# Patient Record
Sex: FEMALE | Race: WHITE | Employment: UNEMPLOYED | URBAN - METROPOLITAN AREA
[De-identification: names, ages, dates, MRNs, and addresses within clinical notes are randomized per-mention and may not be internally consistent; named-entity substitution may affect disease eponyms.]

---

## 2019-02-02 ENCOUNTER — HOSPITAL ENCOUNTER (EMERGENCY)
Age: 24
Discharge: HOME | End: 2019-02-02
Payer: MEDICARE

## 2019-02-02 VITALS
HEART RATE: 96 BPM | WEIGHT: 115 LBS | DIASTOLIC BLOOD PRESSURE: 70 MMHG | RESPIRATION RATE: 16 BRPM | OXYGEN SATURATION: 100 % | TEMPERATURE: 98.3 F | SYSTOLIC BLOOD PRESSURE: 104 MMHG

## 2019-02-02 DIAGNOSIS — S51.812A LACERATION OF LEFT FOREARM, INITIAL ENCOUNTER: Primary | ICD-10-CM

## 2019-02-02 PROCEDURE — 99203 OFFICE O/P NEW LOW 30 MIN: CPT

## 2019-02-02 PROCEDURE — 12013 RPR F/E/E/N/L/M 2.6-5.0 CM: CPT

## 2019-02-02 PROCEDURE — 2500000003 HC RX 250 WO HCPCS: Performed by: NURSE PRACTITIONER

## 2019-02-02 RX ORDER — DEXTROAMPHETAMINE SACCHARATE, AMPHETAMINE ASPARTATE, DEXTROAMPHETAMINE SULFATE AND AMPHETAMINE SULFATE 5; 5; 5; 5 MG/1; MG/1; MG/1; MG/1
20 TABLET ORAL DAILY
COMMUNITY

## 2019-02-02 RX ORDER — LIDOCAINE HYDROCHLORIDE 10 MG/ML
5 INJECTION, SOLUTION INFILTRATION; PERINEURAL ONCE
Status: COMPLETED | OUTPATIENT
Start: 2019-02-02 | End: 2019-02-02

## 2019-02-02 RX ADMIN — LIDOCAINE HYDROCHLORIDE 5 ML: 10 INJECTION, SOLUTION INFILTRATION; PERINEURAL at 19:58

## 2023-02-07 PROBLEM — J38.2 NODULES OF VOCAL CORDS: Status: ACTIVE | Noted: 2023-02-07

## 2023-02-07 PROBLEM — G89.29 CHRONIC THORACIC SPINE PAIN: Status: ACTIVE | Noted: 2023-02-07

## 2023-02-07 PROBLEM — G97.1 SPINAL HEADACHE: Status: ACTIVE | Noted: 2023-02-07

## 2023-02-07 PROBLEM — M54.9 BACK PAIN, CHRONIC: Status: ACTIVE | Noted: 2023-02-07

## 2023-02-07 PROBLEM — J38.3 GLOTTIC INSUFFICIENCY: Status: ACTIVE | Noted: 2023-02-07

## 2023-02-07 PROBLEM — M54.2 CERVICAL PAIN: Status: ACTIVE | Noted: 2023-02-07

## 2023-02-07 PROBLEM — R63.2 POLYPHAGIA: Status: ACTIVE | Noted: 2023-02-07

## 2023-02-07 PROBLEM — G89.29 BACK PAIN, CHRONIC: Status: ACTIVE | Noted: 2023-02-07

## 2023-02-07 PROBLEM — M79.7 FIBROMYALGIA: Status: ACTIVE | Noted: 2023-02-07

## 2023-02-07 PROBLEM — H91.90 HEARING LOSS: Status: ACTIVE | Noted: 2023-02-07

## 2023-02-07 PROBLEM — N39.0 UTI (URINARY TRACT INFECTION): Status: ACTIVE | Noted: 2023-02-07

## 2023-02-07 PROBLEM — R49.0 HOARSENESS: Status: ACTIVE | Noted: 2023-02-07

## 2023-02-07 PROBLEM — F17.210 CIGARETTE NICOTINE DEPENDENCE WITHOUT COMPLICATION: Status: ACTIVE | Noted: 2023-02-07

## 2023-02-07 PROBLEM — F90.9 ADULT ADHD (ATTENTION DEFICIT HYPERACTIVITY DISORDER): Status: ACTIVE | Noted: 2023-02-07

## 2023-02-07 PROBLEM — M54.6 CHRONIC THORACIC SPINE PAIN: Status: ACTIVE | Noted: 2023-02-07

## 2023-02-07 PROBLEM — M25.50 JOINT PAIN: Status: ACTIVE | Noted: 2023-02-07

## 2023-02-07 PROBLEM — E55.9 VITAMIN D DEFICIENCY: Status: ACTIVE | Noted: 2023-02-07

## 2023-02-07 PROBLEM — E04.2 MULTIPLE THYROID NODULES: Status: ACTIVE | Noted: 2023-02-07

## 2023-02-07 PROBLEM — K21.9 GERD (GASTROESOPHAGEAL REFLUX DISEASE): Status: ACTIVE | Noted: 2023-02-07

## 2023-04-01 PROBLEM — N20.0 RECURRENT KIDNEY STONES: Status: ACTIVE | Noted: 2023-04-01

## 2023-04-01 PROBLEM — N20.0 KIDNEY STONE ON LEFT SIDE: Status: ACTIVE | Noted: 2023-04-01

## 2023-04-01 PROBLEM — M54.50 LUMBAGO: Status: ACTIVE | Noted: 2023-04-01

## 2023-04-03 ENCOUNTER — APPOINTMENT (OUTPATIENT)
Dept: PRIMARY CARE | Facility: CLINIC | Age: 28
End: 2023-04-03
Payer: COMMERCIAL

## 2023-05-04 ENCOUNTER — OFFICE VISIT (OUTPATIENT)
Dept: PRIMARY CARE | Facility: CLINIC | Age: 28
End: 2023-05-04
Payer: COMMERCIAL

## 2023-05-04 VITALS
HEIGHT: 63 IN | TEMPERATURE: 98.6 F | HEART RATE: 72 BPM | BODY MASS INDEX: 18.07 KG/M2 | RESPIRATION RATE: 16 BRPM | DIASTOLIC BLOOD PRESSURE: 60 MMHG | SYSTOLIC BLOOD PRESSURE: 100 MMHG | WEIGHT: 102 LBS

## 2023-05-04 DIAGNOSIS — F90.9 ADULT ADHD (ATTENTION DEFICIT HYPERACTIVITY DISORDER): ICD-10-CM

## 2023-05-04 DIAGNOSIS — E04.2 MULTIPLE THYROID NODULES: Primary | ICD-10-CM

## 2023-05-04 DIAGNOSIS — G89.29 CHRONIC BACK PAIN, UNSPECIFIED BACK LOCATION, UNSPECIFIED BACK PAIN LATERALITY: ICD-10-CM

## 2023-05-04 DIAGNOSIS — M54.6 CHRONIC THORACIC SPINE PAIN: ICD-10-CM

## 2023-05-04 DIAGNOSIS — M54.2 CERVICAL PAIN: ICD-10-CM

## 2023-05-04 DIAGNOSIS — E55.9 VITAMIN D DEFICIENCY: ICD-10-CM

## 2023-05-04 DIAGNOSIS — M79.7 FIBROMYALGIA: ICD-10-CM

## 2023-05-04 DIAGNOSIS — Z13.220 LIPID SCREENING: ICD-10-CM

## 2023-05-04 DIAGNOSIS — G89.29 CHRONIC THORACIC SPINE PAIN: ICD-10-CM

## 2023-05-04 DIAGNOSIS — M54.9 CHRONIC BACK PAIN, UNSPECIFIED BACK LOCATION, UNSPECIFIED BACK PAIN LATERALITY: ICD-10-CM

## 2023-05-04 PROCEDURE — 99214 OFFICE O/P EST MOD 30 MIN: CPT | Performed by: FAMILY MEDICINE

## 2023-05-04 RX ORDER — DEXTROAMPHETAMINE SACCHARATE, AMPHETAMINE ASPARTATE, DEXTROAMPHETAMINE SULFATE AND AMPHETAMINE SULFATE 3.75; 3.75; 3.75; 3.75 MG/1; MG/1; MG/1; MG/1
15 TABLET ORAL 2 TIMES DAILY
Refills: 0
Start: 2023-05-04 | End: 2023-10-13 | Stop reason: ALTCHOICE

## 2023-05-04 RX ORDER — OXYCODONE AND ACETAMINOPHEN 5; 325 MG/1; MG/1
1 TABLET ORAL EVERY 12 HOURS
Qty: 60 TABLET | Refills: 0 | Status: SHIPPED | OUTPATIENT
Start: 2023-05-04 | End: 2023-05-30 | Stop reason: SDUPTHER

## 2023-05-04 RX ORDER — MUPIROCIN 20 MG/G
OINTMENT TOPICAL
COMMUNITY
Start: 2023-04-11 | End: 2023-10-27 | Stop reason: ALTCHOICE

## 2023-05-04 ASSESSMENT — PATIENT HEALTH QUESTIONNAIRE - PHQ9
2. FEELING DOWN, DEPRESSED OR HOPELESS: NOT AT ALL
SUM OF ALL RESPONSES TO PHQ9 QUESTIONS 1 AND 2: 0
1. LITTLE INTEREST OR PLEASURE IN DOING THINGS: NOT AT ALL

## 2023-05-04 NOTE — PATIENT INSTRUCTIONS
Nga Phoenix ,    Thank you for coming in today. We at [facility] appreciate your trust in our care. If you have any questions or concerns about the care you received today, please do not hesitate to contact us at [phone].    The following instructions were discussed today:    - get thyroid ultrasound  - get labs   - For blood work: Nothing to eat or drink for at least 10 hours prior. Okay for water or black coffee.   - Follow up in 3 months.

## 2023-05-04 NOTE — PROGRESS NOTES
Subjective   Patient ID: Nga Phoenix is a 28 y.o. female who presents for pt is unable to get to pain mgmnt as often (As she needs to due to kids schedules and transportation.  You did this for her in the past.  No issues with pain mgmnt just hard to get out to [place].).    routine follow up. chronic issues as per assessment and plan.     Patient follows with pain management. The distance has been difficult for her so she is wondering if I can take over the medications.        OARRS:  Ember Morales MD on 5/4/2023  5:29 PM  I have personally reviewed the OARRS report for Nga Phoenix. I have considered the risks of abuse, dependence, addiction and diversion and I believe that it is clinically appropriate for Nga Phoenix to be prescribed this medication    Is the patient prescribed a combination of a benzodiazepine and opioid?  No    Last Urine Drug Screen / ordered today: Yes  Recent Results (from the past [id number] hour(s))   Tapentadol Confirmation, Urine    Collection Time: 01/04/23  3:45 PM   Result Value Ref Range    Tapentadol <25 Cutoff <25 ng/mL    N-Desmethyltapentadol <25 Cutoff <25 ng/mL   Buprenorphine Confirm,Urine    Collection Time: 01/04/23  3:45 PM   Result Value Ref Range    BUPRENORPHINE GLUC, URINE <5 ng/mL    BUPRENORPHINE ,URINE <2 ng/mL    NALOXONE, URINE <100 ng/mL    NORBUPRENORPHINE GLUC,URINE <5 ng/mL    NORBUPRENORPHINE, URINE <2 ng/mL   OPIATE/OPIOID/BENZO PRESCRIPTION COMPLIANCE    Collection Time: 01/04/23  3:45 PM   Result Value Ref Range    DRUG SCREEN COMMENT URINE SEE BELOW     Creatine, Urine 66.9 mg/dL    Amphetamine Screen, Urine PRESUMPTIVE POSITIVE (A) NEGATIVE    Barbiturate Screen, Urine PRESUMPTIVE NEGATIVE NEGATIVE    Cannabinoid Screen, Urine PRESUMPTIVE NEGATIVE NEGATIVE    Cocaine Screen, Urine PRESUMPTIVE NEGATIVE NEGATIVE    PCP Screen, Urine PRESUMPTIVE NEGATIVE NEGATIVE    7-Aminoclonazepam <25 Cutoff <25 ng/mL    Alpha-Hydroxyalprazolam <25 Cutoff <25  ng/mL    Alpha-Hydroxymidazolam <25 Cutoff <25 ng/mL    Alprazolam <25 Cutoff <25 ng/mL    Chlordiazepoxide <25 Cutoff <25 ng/mL    Clonazepam <25 Cutoff <25 ng/mL    Diazepam <25 Cutoff <25 ng/mL    Lorazepam <25 Cutoff <25 ng/mL    Midazolam <25 Cutoff <25 ng/mL    Nordiazepam <25 Cutoff <25 ng/mL    Oxazepam <25 Cutoff <25 ng/mL    Temazepam <25 Cutoff <25 ng/mL    Zolpidem <25 Cutoff <25 ng/mL    Zolpidem Metabolite (ZCA) <25 Cutoff <25 ng/mL    6-Acetylmorphine <25 Cutoff <25 ng/mL    Codeine <50 Cutoff <50 ng/mL    Hydrocodone <25 Cutoff <25 ng/mL    Hydromorphone <25 Cutoff <25 ng/mL    Morphine Urine <50 Cutoff <50 ng/mL    Norhydrocodone <25 Cutoff <25 ng/mL    Noroxycodone 346 (A) Cutoff <25 ng/mL    Oxycodone 28 (A) Cutoff <25 ng/mL    Oxymorphone 128 (A) Cutoff <25 ng/mL    Tramadol <50 Cutoff <50 ng/mL    O-Desmethyltramadol <50 Cutoff <50 ng/mL    Fentanyl <2.5 Cutoff<2.5 ng/mL    Norfentanyl <2.5 Cutoff<2.5 ng/mL    METHADONE CONFIRMATION,URINE <25 Cutoff <25 ng/mL    EDDP <25 Cutoff <25 ng/mL   Amphetamine Confirm, Urine    Collection Time: 01/04/23  3:45 PM   Result Value Ref Range    Amphetamines,Urine >5000 ng/mL    MDA, Urine <200 ng/mL    MDEA, Urine <200 ng/mL    MDMA, Urine <200 ng/mL    Methamphetamine Quant, Ur <200 ng/mL    Phentermine,Urine <200 ng/mL     Results are as expected.     Controlled Substance Agreement:  Date of the Last Agreement: 5/14/23  Reviewed Controlled Substance Agreement including but not limited to the benefits, risks, and alternatives to treatment with a Controlled Substance medication(s).    Opioids:  What is the patient's goal of therapy? Reduction of pain  Is this being achieved with current treatment? yes    I have calculated the patient's Morphine Dose Equivalent (MED):   I have considered referral to Pain Management and/or a specialist, and do not feel it is necessary at this time.    I feel that it is clinically indicated to continue this current medication  "regimen after consideration of alternative therapies, and other non-opioid treatment.    Opioid Risk Screening:  No data recorded    Pain Assessment:  No data recorded  Review of Systems    Objective   /60 (BP Location: Right arm, Patient Position: Sitting, BP Cuff Size: Adult)   Pulse 72   Temp 37 °C (98.6 °F)   Resp 16   Ht 1.6 m (5' 3\")   Wt 46.3 kg (102 lb)   BMI 18.07 kg/m²     Physical Exam    Assessment/Plan   Problem List Items Addressed This Visit       Adult ADHD (attention deficit hyperactivity disorder)    Relevant Medications    amphetamine-dextroamphetamine (Adderall) 15 mg tablet    Other Relevant Orders    CBC and Auto Differential    Comprehensive Metabolic Panel    TSH with reflex to Free T4 if abnormal    Back pain, chronic    Relevant Medications    oxyCODONE-acetaminophen (Percocet) 5-325 mg tablet    Other Relevant Orders    CBC and Auto Differential    Comprehensive Metabolic Panel    TSH with reflex to Free T4 if abnormal    Cervical pain    Relevant Medications    oxyCODONE-acetaminophen (Percocet) 5-325 mg tablet    Other Relevant Orders    CBC and Auto Differential    Comprehensive Metabolic Panel    TSH with reflex to Free T4 if abnormal    Chronic thoracic spine pain    Relevant Medications    oxyCODONE-acetaminophen (Percocet) 5-325 mg tablet    Other Relevant Orders    CBC and Auto Differential    Comprehensive Metabolic Panel    TSH with reflex to Free T4 if abnormal    Fibromyalgia    Relevant Medications    oxyCODONE-acetaminophen (Percocet) 5-325 mg tablet    Other Relevant Orders    CBC and Auto Differential    Comprehensive Metabolic Panel    TSH with reflex to Free T4 if abnormal    Multiple thyroid nodules - Primary    Relevant Orders    US thyroid    CBC and Auto Differential    Comprehensive Metabolic Panel    TSH with reflex to Free T4 if abnormal    Vitamin D deficiency    Relevant Orders    TSH with reflex to Free T4 if abnormal    Vitamin D, Total     Other " Visit Diagnoses       Lipid screening        Relevant Orders    Lipid Panel

## 2023-05-22 ENCOUNTER — APPOINTMENT (OUTPATIENT)
Dept: PRIMARY CARE | Facility: CLINIC | Age: 28
End: 2023-05-22
Payer: COMMERCIAL

## 2023-05-30 DIAGNOSIS — M79.7 FIBROMYALGIA: ICD-10-CM

## 2023-05-30 DIAGNOSIS — M54.9 CHRONIC BACK PAIN, UNSPECIFIED BACK LOCATION, UNSPECIFIED BACK PAIN LATERALITY: ICD-10-CM

## 2023-05-30 DIAGNOSIS — M54.6 CHRONIC THORACIC SPINE PAIN: ICD-10-CM

## 2023-05-30 DIAGNOSIS — M54.2 CERVICAL PAIN: ICD-10-CM

## 2023-05-30 DIAGNOSIS — G89.29 CHRONIC THORACIC SPINE PAIN: ICD-10-CM

## 2023-05-30 DIAGNOSIS — G89.29 CHRONIC BACK PAIN, UNSPECIFIED BACK LOCATION, UNSPECIFIED BACK PAIN LATERALITY: ICD-10-CM

## 2023-05-30 RX ORDER — OXYCODONE AND ACETAMINOPHEN 5; 325 MG/1; MG/1
1 TABLET ORAL EVERY 12 HOURS
Qty: 60 TABLET | Refills: 0 | Status: SHIPPED | OUTPATIENT
Start: 2023-05-30 | End: 2023-06-26 | Stop reason: SDUPTHER

## 2023-05-30 NOTE — TELEPHONE ENCOUNTER
Last appointment: 5/4/2023  Next appointment: 8/14/2023     Eprescribed. I have personally reviewed the OARRS report.  This report is scanned into the electronic medical record.  I have considered the risks of abuse, dependence, addiction and diversion.  I believe that it is clinically appropriate to prescribe this medication.

## 2023-06-26 DIAGNOSIS — G89.29 CHRONIC THORACIC SPINE PAIN: ICD-10-CM

## 2023-06-26 DIAGNOSIS — M79.7 FIBROMYALGIA: ICD-10-CM

## 2023-06-26 DIAGNOSIS — M54.9 CHRONIC BACK PAIN, UNSPECIFIED BACK LOCATION, UNSPECIFIED BACK PAIN LATERALITY: ICD-10-CM

## 2023-06-26 DIAGNOSIS — G89.29 CHRONIC BACK PAIN, UNSPECIFIED BACK LOCATION, UNSPECIFIED BACK PAIN LATERALITY: ICD-10-CM

## 2023-06-26 DIAGNOSIS — M54.6 CHRONIC THORACIC SPINE PAIN: ICD-10-CM

## 2023-06-26 DIAGNOSIS — M54.2 CERVICAL PAIN: ICD-10-CM

## 2023-06-26 RX ORDER — OXYCODONE AND ACETAMINOPHEN 5; 325 MG/1; MG/1
1 TABLET ORAL EVERY 12 HOURS
Qty: 60 TABLET | Refills: 0 | Status: SHIPPED | OUTPATIENT
Start: 2023-06-26 | End: 2023-07-24 | Stop reason: SDUPTHER

## 2023-06-30 LAB
AMPHETAMINE (PRESENCE) IN URINE BY SCREEN METHOD: ABNORMAL
BARBITURATES PRESENCE IN URINE BY SCREEN METHOD: ABNORMAL
BENZODIAZEPINE (PRESENCE) IN URINE BY SCREEN METHOD: ABNORMAL
CANNABINOIDS IN URINE BY SCREEN METHOD: ABNORMAL
COCAINE (PRESENCE) IN URINE BY SCREEN METHOD: ABNORMAL
DRUG SCREEN COMMENT URINE: ABNORMAL
ERYTHROCYTE DISTRIBUTION WIDTH (RATIO) BY AUTOMATED COUNT: 12.5 % (ref 11.5–14.5)
ERYTHROCYTE MEAN CORPUSCULAR HEMOGLOBIN CONCENTRATION (G/DL) BY AUTOMATED: 32.9 G/DL (ref 32–36)
ERYTHROCYTE MEAN CORPUSCULAR VOLUME (FL) BY AUTOMATED COUNT: 98 FL (ref 80–100)
ERYTHROCYTES (10*6/UL) IN BLOOD BY AUTOMATED COUNT: 3.3 X10E12/L (ref 4–5.2)
FENTANYL URINE: ABNORMAL
FERRITIN, PREGNANCY: 60 UG/L
FOLATE, SERUM, PREGNANCY: 21.5 NG/ML
HEMATOCRIT (%) IN BLOOD BY AUTOMATED COUNT: 32.5 % (ref 36–46)
HEMOGLOBIN (G/DL) IN BLOOD: 10.7 G/DL (ref 12–16)
HEPATITIS B VIRUS SURFACE AG PRESENCE IN SERUM: NONREACTIVE
IRON (UG/DL) IN SER/PLAS IN PREGNANCY: 89 UG/DL
IRON BINDING CAPACITY (UG/DL) IN PREGNANCY: 350 UG/DL
IRON SATURATION (%) IN PREGNANCY: 25 %
LEUKOCYTES (10*3/UL) IN BLOOD BY AUTOMATED COUNT: 10.9 X10E9/L (ref 4.4–11.3)
METHADONE (PRESENCE) IN URINE BY SCREEN METHOD: ABNORMAL
OPIATES (PRESENCE) IN URINE BY SCREEN METHOD: ABNORMAL
OXYCODONE (PRESENCE) IN URINE BY SCREEN METHOD: ABNORMAL
PHENCYCLIDINE (PRESENCE) IN URINE BY SCREEN METHOD: ABNORMAL
PLATELETS (10*3/UL) IN BLOOD AUTOMATED COUNT: 381 X10E9/L (ref 150–450)
REFLEX ADDED, ANEMIA PANEL: ABNORMAL
THYROTROPIN (MIU/L) IN SER/PLAS BY DETECTION LIMIT <= 0.05 MIU/L: 1.22 MIU/L (ref 0.44–3.98)
VITAMIN B12, PREGNANCY: 407 PG/ML

## 2023-07-01 LAB
ABO GROUP (TYPE) IN BLOOD: NORMAL
ANTIBODY SCREEN: NORMAL
HEPATITIS C VIRUS AB PRESENCE IN SERUM: NONREACTIVE
HIV 1/ 2 AG/AB SCREEN: NONREACTIVE
RH FACTOR: NORMAL
RUBELLA VIRUS IGG AB: POSITIVE
SYPHILIS TOTAL AB: NONREACTIVE
VARICELLA ZOSTER IGG: POSITIVE

## 2023-07-02 LAB — URINE CULTURE: NO GROWTH

## 2023-07-06 LAB
6-ACETYLMORPHINE: <25 NG/ML
AMPHETAMINES,URINE: >5000 NG/ML
CODEINE: <50 NG/ML
HYDROCODONE: <25 NG/ML
HYDROMORPHONE: <25 NG/ML
MDA,URINE: <200 NG/ML
MDEA,URINE: <200 NG/ML
MDMA,UR: <200 NG/ML
METHAMPHETAMINE QUANTITATIVE URINE: <200 NG/ML
MORPHINE URINE: <50 NG/ML
NORHYDROCODONE: <25 NG/ML
NOROXYCODONE: >1000 NG/ML
OXYCODONE: 419 NG/ML
OXYMORPHONE: 261 NG/ML
PHENTERMINE,UR: <200 NG/ML

## 2023-07-24 DIAGNOSIS — M54.9 CHRONIC BACK PAIN, UNSPECIFIED BACK LOCATION, UNSPECIFIED BACK PAIN LATERALITY: ICD-10-CM

## 2023-07-24 DIAGNOSIS — G89.29 CHRONIC BACK PAIN, UNSPECIFIED BACK LOCATION, UNSPECIFIED BACK PAIN LATERALITY: ICD-10-CM

## 2023-07-24 DIAGNOSIS — G89.29 CHRONIC THORACIC SPINE PAIN: ICD-10-CM

## 2023-07-24 DIAGNOSIS — M79.7 FIBROMYALGIA: ICD-10-CM

## 2023-07-24 DIAGNOSIS — M54.6 CHRONIC THORACIC SPINE PAIN: ICD-10-CM

## 2023-07-24 DIAGNOSIS — M54.2 CERVICAL PAIN: ICD-10-CM

## 2023-07-27 RX ORDER — OXYCODONE AND ACETAMINOPHEN 5; 325 MG/1; MG/1
1 TABLET ORAL EVERY 12 HOURS
Qty: 60 TABLET | Refills: 0 | Status: SHIPPED | OUTPATIENT
Start: 2023-07-27 | End: 2023-08-21 | Stop reason: SDUPTHER

## 2023-08-12 ENCOUNTER — TELEMEDICINE (OUTPATIENT)
Dept: PRIMARY CARE | Facility: CLINIC | Age: 28
End: 2023-08-12
Payer: COMMERCIAL

## 2023-08-12 DIAGNOSIS — J01.00 ACUTE MAXILLARY SINUSITIS, RECURRENCE NOT SPECIFIED: Primary | ICD-10-CM

## 2023-08-12 PROCEDURE — 99213 OFFICE O/P EST LOW 20 MIN: CPT | Performed by: FAMILY MEDICINE

## 2023-08-12 RX ORDER — AZITHROMYCIN 250 MG/1
TABLET, FILM COATED ORAL
Qty: 6 TABLET | Refills: 0 | Status: SHIPPED | OUTPATIENT
Start: 2023-08-12 | End: 2023-10-13 | Stop reason: ALTCHOICE

## 2023-08-12 ASSESSMENT — LIFESTYLE VARIABLES: HISTORY_OF_SMOKING: I AM A CURRENT SMOKER

## 2023-08-12 NOTE — PATIENT INSTRUCTIONS
Please take your medications as prescribed  please call your primary care provider if your symptoms fail to improve or worsen

## 2023-08-12 NOTE — PROGRESS NOTES
Subjective   Patient ID: Nga Phoenix is a 28 y.o. female who presents for sinus pressure and headaches  HPI  Virtual Visit    An interactive audio and video telecommunication system which permits real time communications between the patient (at the originating site) and provider (at the distant site) was utilized to provide this telehealth service.   Verbal consent was requested and obtained from Nga Phoenix on this date, 08/12/23 for a telehealth visit.     Has been having sinus pressure and headaches x 2 weeks, no fever. Has cough as well. No wheezing, does feel sob with the congestion. No vomiting/diarrhea. Has some nausea because of the nasal congestion. Tried OTC medications including nyquil and dayquil, not helping.   Confirmed allergies, patient is also pregnant, did discuss with patient risk to baby with the azithromycin and potential to cross placenta barrier.     Review of Systems  General: no fever  Eyes: no blurry vision  ENT: see HPI  Resp: see HPI  Cardio: no chest pain, no palpitations  Abd: no nausea/vomiting     Vitals:   due to telehealth visit, vitals not obtained, patient did not have them available at the time of the visit       Objective   Physical Exam  Physical exam done virtually through the computer screen     Gen: NAD  eyes: conjunctivae normal   Nose: external nose normal   Resp: does not appear to be short of breath at present time, no audible wheezing, maxillary sinus tenderness when patient presses on her sinuses    Assessment/Plan   Problem List Items Addressed This Visit    None  Visit Diagnoses       Acute maxillary sinusitis, recurrence not specified    -  Primary    Relevant Medications    azithromycin (Zithromax) 250 mg tablet

## 2023-08-14 ENCOUNTER — APPOINTMENT (OUTPATIENT)
Dept: PRIMARY CARE | Facility: CLINIC | Age: 28
End: 2023-08-14
Payer: COMMERCIAL

## 2023-08-21 DIAGNOSIS — M79.7 FIBROMYALGIA: ICD-10-CM

## 2023-08-21 DIAGNOSIS — M54.9 CHRONIC BACK PAIN, UNSPECIFIED BACK LOCATION, UNSPECIFIED BACK PAIN LATERALITY: ICD-10-CM

## 2023-08-21 DIAGNOSIS — G89.29 CHRONIC THORACIC SPINE PAIN: ICD-10-CM

## 2023-08-21 DIAGNOSIS — M54.6 CHRONIC THORACIC SPINE PAIN: ICD-10-CM

## 2023-08-21 DIAGNOSIS — G89.29 CHRONIC BACK PAIN, UNSPECIFIED BACK LOCATION, UNSPECIFIED BACK PAIN LATERALITY: ICD-10-CM

## 2023-08-21 DIAGNOSIS — M54.2 CERVICAL PAIN: ICD-10-CM

## 2023-08-21 RX ORDER — OXYCODONE AND ACETAMINOPHEN 5; 325 MG/1; MG/1
1 TABLET ORAL EVERY 12 HOURS
Qty: 60 TABLET | Refills: 0 | Status: SHIPPED | OUTPATIENT
Start: 2023-08-21 | End: 2023-08-21 | Stop reason: SDUPTHER

## 2023-08-21 RX ORDER — OXYCODONE AND ACETAMINOPHEN 5; 325 MG/1; MG/1
1 TABLET ORAL EVERY 12 HOURS
Qty: 60 TABLET | Refills: 0 | Status: SHIPPED | OUTPATIENT
Start: 2023-08-21 | End: 2023-09-14 | Stop reason: SDUPTHER

## 2023-09-14 DIAGNOSIS — M54.9 CHRONIC BACK PAIN, UNSPECIFIED BACK LOCATION, UNSPECIFIED BACK PAIN LATERALITY: ICD-10-CM

## 2023-09-14 DIAGNOSIS — G89.29 CHRONIC BACK PAIN, UNSPECIFIED BACK LOCATION, UNSPECIFIED BACK PAIN LATERALITY: ICD-10-CM

## 2023-09-14 DIAGNOSIS — M79.7 FIBROMYALGIA: ICD-10-CM

## 2023-09-14 DIAGNOSIS — G89.29 CHRONIC THORACIC SPINE PAIN: ICD-10-CM

## 2023-09-14 DIAGNOSIS — M54.2 CERVICAL PAIN: ICD-10-CM

## 2023-09-14 DIAGNOSIS — M54.6 CHRONIC THORACIC SPINE PAIN: ICD-10-CM

## 2023-09-14 RX ORDER — OXYCODONE AND ACETAMINOPHEN 5; 325 MG/1; MG/1
1 TABLET ORAL EVERY 12 HOURS
Qty: 60 TABLET | Refills: 0 | Status: SHIPPED | OUTPATIENT
Start: 2023-09-19 | End: 2023-10-17 | Stop reason: SDUPTHER

## 2023-09-14 NOTE — TELEPHONE ENCOUNTER
Last appointment: 5/4/2023  Next appointment: 11/7/2023     Eprescribed. I have personally reviewed the OARRS report.  This report is scanned into the electronic medical record.  I have considered the risks of abuse, dependence, addiction and diversion.  I believe that it is clinically appropriate to prescribe this medication.

## 2023-10-04 PROBLEM — R82.5 POSITIVE URINE DRUG SCREEN: Status: ACTIVE | Noted: 2023-10-04

## 2023-10-04 PROBLEM — O99.012 ANEMIA DURING PREGNANCY IN SECOND TRIMESTER (HHS-HCC): Status: ACTIVE | Noted: 2023-10-04

## 2023-10-04 PROBLEM — Z3A.22 22 WEEKS GESTATION OF PREGNANCY (HHS-HCC): Status: ACTIVE | Noted: 2023-10-04

## 2023-10-04 PROBLEM — N92.6 MISSED MENSES: Status: ACTIVE | Noted: 2023-10-04

## 2023-10-04 PROBLEM — O21.9 NAUSEA/VOMITING IN PREGNANCY (HHS-HCC): Status: ACTIVE | Noted: 2023-10-04

## 2023-10-04 PROBLEM — E04.1 THYROID NODULE: Status: ACTIVE | Noted: 2023-10-04

## 2023-10-04 PROBLEM — B37.9 YEAST INFECTION: Status: ACTIVE | Noted: 2023-10-04

## 2023-10-04 PROBLEM — Z15.89 HLA B27 POSITIVE: Status: ACTIVE | Noted: 2023-10-04

## 2023-10-04 RX ORDER — PNV NO.95/FERROUS FUM/FOLIC AC 28MG-0.8MG
1 TABLET ORAL DAILY
COMMUNITY

## 2023-10-04 RX ORDER — FERROUS SULFATE TAB 325 MG (65 MG ELEMENTAL FE) 325 (65 FE) MG
1 TAB ORAL DAILY
COMMUNITY

## 2023-10-04 RX ORDER — TERCONAZOLE 8 MG/G
CREAM VAGINAL
COMMUNITY
Start: 2023-07-06 | End: 2023-10-13 | Stop reason: ALTCHOICE

## 2023-10-04 RX ORDER — PYRIDOXINE HCL (VITAMIN B6) 25 MG
25 TABLET ORAL 3 TIMES DAILY
COMMUNITY
End: 2023-12-10 | Stop reason: HOSPADM

## 2023-10-13 ENCOUNTER — ROUTINE PRENATAL (OUTPATIENT)
Dept: OBSTETRICS AND GYNECOLOGY | Facility: CLINIC | Age: 28
End: 2023-10-13
Payer: COMMERCIAL

## 2023-10-13 ENCOUNTER — LAB (OUTPATIENT)
Dept: LAB | Facility: LAB | Age: 28
End: 2023-10-13
Payer: COMMERCIAL

## 2023-10-13 VITALS — WEIGHT: 127.5 LBS | BODY MASS INDEX: 22.59 KG/M2 | DIASTOLIC BLOOD PRESSURE: 68 MMHG | SYSTOLIC BLOOD PRESSURE: 100 MMHG

## 2023-10-13 DIAGNOSIS — Z3A.28 28 WEEKS GESTATION OF PREGNANCY (HHS-HCC): ICD-10-CM

## 2023-10-13 DIAGNOSIS — F17.210 CIGARETTE NICOTINE DEPENDENCE WITHOUT COMPLICATION: ICD-10-CM

## 2023-10-13 DIAGNOSIS — Z23 NEED FOR TDAP VACCINATION: ICD-10-CM

## 2023-10-13 PROBLEM — O99.013 ANEMIA DURING PREGNANCY IN THIRD TRIMESTER (HHS-HCC): Status: ACTIVE | Noted: 2023-10-04

## 2023-10-13 LAB
ERYTHROCYTE [DISTWIDTH] IN BLOOD BY AUTOMATED COUNT: 12.6 % (ref 11.5–14.5)
GLUCOSE 1H P 50 G GLC PO SERPL-MCNC: 73 MG/DL
HCT VFR BLD AUTO: 30.7 % (ref 36–46)
HGB BLD-MCNC: 10.1 G/DL (ref 12–16)
MCH RBC QN AUTO: 33.6 PG (ref 26–34)
MCHC RBC AUTO-ENTMCNC: 32.9 G/DL (ref 32–36)
MCV RBC AUTO: 102 FL (ref 80–100)
NRBC BLD-RTO: 0 /100 WBCS (ref 0–0)
PLATELET # BLD AUTO: 327 X10*3/UL (ref 150–450)
PMV BLD AUTO: 10.6 FL (ref 7.5–11.5)
RBC # BLD AUTO: 3.01 X10*6/UL (ref 4–5.2)
WBC # BLD AUTO: 10.7 X10*3/UL (ref 4.4–11.3)

## 2023-10-13 PROCEDURE — 82607 VITAMIN B-12: CPT

## 2023-10-13 PROCEDURE — 86780 TREPONEMA PALLIDUM: CPT

## 2023-10-13 PROCEDURE — 90715 TDAP VACCINE 7 YRS/> IM: CPT | Performed by: OBSTETRICS & GYNECOLOGY

## 2023-10-13 PROCEDURE — 82947 ASSAY GLUCOSE BLOOD QUANT: CPT

## 2023-10-13 PROCEDURE — 36415 COLL VENOUS BLD VENIPUNCTURE: CPT

## 2023-10-13 PROCEDURE — 82746 ASSAY OF FOLIC ACID SERUM: CPT

## 2023-10-13 PROCEDURE — 90471 IMMUNIZATION ADMIN: CPT | Performed by: OBSTETRICS & GYNECOLOGY

## 2023-10-13 PROCEDURE — 85027 COMPLETE CBC AUTOMATED: CPT

## 2023-10-13 PROCEDURE — 99213 OFFICE O/P EST LOW 20 MIN: CPT | Performed by: OBSTETRICS & GYNECOLOGY

## 2023-10-13 RX ORDER — DEXTROAMPHETAMINE SACCHARATE, AMPHETAMINE ASPARTATE, DEXTROAMPHETAMINE SULFATE AND AMPHETAMINE SULFATE 5; 5; 5; 5 MG/1; MG/1; MG/1; MG/1
20 TABLET ORAL DAILY
COMMUNITY
End: 2023-11-07 | Stop reason: SDUPTHER

## 2023-10-13 NOTE — PROGRESS NOTES
Subjective   Patient ID [id number]   Nga Phoenix is a 28 y.o.  at 28w4d with a working estimated date of delivery of 2024, by Ultrasound who presents for a routine prenatal visit. She denies vaginal bleeding, leakage of fluid, decreased fetal movements, or contractions.    Her pregnancy is complicated by:  History of  delivery x2, both late  at 35 and 36 weeks, asthma, ADHD, thyroid nodule, anemia    Objective   Physical Exam:   Weight: 57.8 kg (127 lb 8 oz)  Expected Total Weight Gain: 11.5 kg (25 lb)-16 kg (35 lb)   Pregravid BMI: 19.84  BP: 100/68                  Prenatal Labs  Urine Dip:  Lab Results   Component Value Date    KETONESU 100 (2+) (A) 2022     Lab Results   Component Value Date    HGB 10.7 (L) 2023    HCT 32.5 (L) 2023    ABO A 2023    HEPBSAG NONREACTIVE 2023         Imaging  The most recent ultrasound was performed on   with a study GA of   and EFW of  .          Assessment/Plan   Problem List Items Addressed This Visit             ICD-10-CM    Cigarette nicotine dependence without complication - Primary F17.210    28 weeks gestation of pregnancy Z3A.28    Relevant Orders    CBC Anemia Panel With Reflex, Pregnancy    Glucose, 1 Hour Screen, Pregnancy    Syphilis Screen with Reflex     Other Visit Diagnoses         Codes    Need for Tdap vaccination     Z23    Relevant Orders    Tdap vaccine, age 7 years and older  (BOOSTRIX) (Completed)          Continue prenatal vitamin.  Expected mode of delivery vaginal   Follow up in 2 week for a routine prenatal visit.

## 2023-10-14 LAB
FOLATE SERPL-MCNC: 20 NG/ML
REFLEX ADDED, ANEMIA PANEL: NORMAL
T PALLIDUM AB SER QL: NONREACTIVE
VIT B12 SERPL-MCNC: 310 PG/ML

## 2023-10-17 DIAGNOSIS — M54.9 CHRONIC BACK PAIN, UNSPECIFIED BACK LOCATION, UNSPECIFIED BACK PAIN LATERALITY: ICD-10-CM

## 2023-10-17 DIAGNOSIS — G89.29 CHRONIC THORACIC SPINE PAIN: ICD-10-CM

## 2023-10-17 DIAGNOSIS — M79.7 FIBROMYALGIA: ICD-10-CM

## 2023-10-17 DIAGNOSIS — G89.29 CHRONIC BACK PAIN, UNSPECIFIED BACK LOCATION, UNSPECIFIED BACK PAIN LATERALITY: ICD-10-CM

## 2023-10-17 DIAGNOSIS — M54.2 CERVICAL PAIN: ICD-10-CM

## 2023-10-17 DIAGNOSIS — M54.6 CHRONIC THORACIC SPINE PAIN: ICD-10-CM

## 2023-10-17 RX ORDER — OXYCODONE AND ACETAMINOPHEN 5; 325 MG/1; MG/1
1 TABLET ORAL EVERY 12 HOURS
Qty: 60 TABLET | Refills: 0 | Status: SHIPPED | OUTPATIENT
Start: 2023-10-17 | End: 2023-11-07 | Stop reason: SDUPTHER

## 2023-10-23 ENCOUNTER — TELEPHONE (OUTPATIENT)
Dept: OBSTETRICS AND GYNECOLOGY | Facility: CLINIC | Age: 28
End: 2023-10-23
Payer: COMMERCIAL

## 2023-10-23 NOTE — TELEPHONE ENCOUNTER
Patient called in wanting to schedule a 30 week U/S.  She claims she has been trying to call for 3 weeks now downstairs to schedule. She also claims she is 30 weeks now.  She didn't have the paper to let me know what she needs scheduled.  Please advise.    She mays have an appointment on the 27th with TB.

## 2023-10-25 ENCOUNTER — ANCILLARY PROCEDURE (OUTPATIENT)
Dept: RADIOLOGY | Facility: CLINIC | Age: 28
End: 2023-10-25
Payer: COMMERCIAL

## 2023-10-25 DIAGNOSIS — Z34.90 ENCOUNTER FOR SUPERVISION OF NORMAL PREGNANCY, UNSPECIFIED, UNSPECIFIED TRIMESTER (HHS-HCC): ICD-10-CM

## 2023-10-25 PROCEDURE — 76816 OB US FOLLOW-UP PER FETUS: CPT | Performed by: STUDENT IN AN ORGANIZED HEALTH CARE EDUCATION/TRAINING PROGRAM

## 2023-10-25 PROCEDURE — 76816 OB US FOLLOW-UP PER FETUS: CPT

## 2023-10-25 PROCEDURE — 76819 FETAL BIOPHYS PROFIL W/O NST: CPT | Performed by: STUDENT IN AN ORGANIZED HEALTH CARE EDUCATION/TRAINING PROGRAM

## 2023-10-26 NOTE — PROGRESS NOTES
"Subjective   Patient ID [id number]   Nga Phoenix is a 28 y.o.  at 30w 4d with a working estimated date of delivery of 2024, by Ultrasound who presents for a routine prenatal visit. She denies vaginal bleeding, leakage of fluid, decreased fetal movements, or contractions.  C/o flank pain and dysuria  Her pregnancy is complicated by:   delivery x 2, anemia in pregnancy     Expected Total Weight Gain: 11.5 kg (25 lb)-16 kg (35 lb)   Pregravid BMI: 19.84                     Prenatal Labs  Urine Dip:  Lab Results   Component Value Date    KETONESU 100 (2+) (A) 2022     Lab Results   Component Value Date    HGB 10.1 (L) 10/13/2023    HCT 30.7 (L) 10/13/2023    ABO A 2023    HEPBSAG NONREACTIVE 2023     No results found for: \"PAPPA\", \"AFP\", \"HCG\", \"ESTRIOL\", \"INHBA\"  No results found for: \"GLUF\", \"GLUT1\", \"DFAWTNR3WP\", \"IHCDDMW9LG\"    Imaging  The most recent ultrasound was performed on   with a study GA of   and EFW of  .          Assessment/Plan   Problem List Items Addressed This Visit             ICD-10-CM    Cigarette nicotine dependence without complication F17.210    Anemia during pregnancy in third trimester - Primary O99.013    30 weeks gestation of pregnancy Z3A.30     Other Visit Diagnoses         Codes    Flu vaccine need     Z23    Relevant Orders    Flu vaccine (IIV4) age 6 months and greater, preservative free (Completed)          Continue prenatal vitamin.  Labs reviewed.  C/o flank pain and Dysuria, clean catch urine cx ordered  Expected mode of delivery vaginal  Flu vaccine done  Follow up in 2 week for a routine prenatal visit.  "

## 2023-10-27 ENCOUNTER — ROUTINE PRENATAL (OUTPATIENT)
Dept: OBSTETRICS AND GYNECOLOGY | Facility: CLINIC | Age: 28
End: 2023-10-27
Payer: COMMERCIAL

## 2023-10-27 VITALS — SYSTOLIC BLOOD PRESSURE: 100 MMHG | DIASTOLIC BLOOD PRESSURE: 62 MMHG | WEIGHT: 129 LBS | BODY MASS INDEX: 22.85 KG/M2

## 2023-10-27 DIAGNOSIS — O26.893 DYSURIA DURING PREGNANCY IN THIRD TRIMESTER (HHS-HCC): ICD-10-CM

## 2023-10-27 DIAGNOSIS — Z23 FLU VACCINE NEED: ICD-10-CM

## 2023-10-27 DIAGNOSIS — Z3A.30 30 WEEKS GESTATION OF PREGNANCY (HHS-HCC): ICD-10-CM

## 2023-10-27 DIAGNOSIS — O99.013 ANEMIA DURING PREGNANCY IN THIRD TRIMESTER (HHS-HCC): Primary | ICD-10-CM

## 2023-10-27 DIAGNOSIS — F17.210 CIGARETTE NICOTINE DEPENDENCE WITHOUT COMPLICATION: ICD-10-CM

## 2023-10-27 DIAGNOSIS — R30.0 DYSURIA DURING PREGNANCY IN THIRD TRIMESTER (HHS-HCC): ICD-10-CM

## 2023-10-27 PROBLEM — Z3A.22 22 WEEKS GESTATION OF PREGNANCY (HHS-HCC): Status: RESOLVED | Noted: 2023-10-04 | Resolved: 2023-10-27

## 2023-10-27 LAB
POC BILIRUBIN, URINE: NEGATIVE
POC BLOOD, URINE: NEGATIVE
POC GLUCOSE, URINE: NEGATIVE MG/DL
POC KETONES, URINE: NEGATIVE MG/DL
POC LEUKOCYTES, URINE: ABNORMAL
POC NITRITE,URINE: NEGATIVE
POC PH, URINE: 7 PH
POC PROTEIN, URINE: ABNORMAL MG/DL
POC SPECIFIC GRAVITY, URINE: 1.01
POC UROBILINOGEN, URINE: 0.2 EU/DL

## 2023-10-27 PROCEDURE — 99213 OFFICE O/P EST LOW 20 MIN: CPT | Performed by: OBSTETRICS & GYNECOLOGY

## 2023-10-27 PROCEDURE — 90471 IMMUNIZATION ADMIN: CPT | Performed by: OBSTETRICS & GYNECOLOGY

## 2023-10-27 PROCEDURE — 81003 URINALYSIS AUTO W/O SCOPE: CPT | Performed by: OBSTETRICS & GYNECOLOGY

## 2023-10-27 PROCEDURE — 87086 URINE CULTURE/COLONY COUNT: CPT

## 2023-10-27 PROCEDURE — 90686 IIV4 VACC NO PRSV 0.5 ML IM: CPT | Performed by: OBSTETRICS & GYNECOLOGY

## 2023-10-29 LAB — BACTERIA UR CULT: NO GROWTH

## 2023-11-07 ENCOUNTER — OFFICE VISIT (OUTPATIENT)
Dept: PRIMARY CARE | Facility: CLINIC | Age: 28
End: 2023-11-07
Payer: COMMERCIAL

## 2023-11-07 VITALS
SYSTOLIC BLOOD PRESSURE: 120 MMHG | BODY MASS INDEX: 23.04 KG/M2 | TEMPERATURE: 98 F | RESPIRATION RATE: 16 BRPM | WEIGHT: 130 LBS | HEIGHT: 63 IN | DIASTOLIC BLOOD PRESSURE: 64 MMHG | HEART RATE: 100 BPM | OXYGEN SATURATION: 97 %

## 2023-11-07 DIAGNOSIS — F90.9 ADULT ADHD (ATTENTION DEFICIT HYPERACTIVITY DISORDER): Primary | ICD-10-CM

## 2023-11-07 DIAGNOSIS — M54.6 CHRONIC THORACIC SPINE PAIN: ICD-10-CM

## 2023-11-07 DIAGNOSIS — E04.2 MULTIPLE THYROID NODULES: ICD-10-CM

## 2023-11-07 DIAGNOSIS — G89.29 CHRONIC THORACIC SPINE PAIN: ICD-10-CM

## 2023-11-07 DIAGNOSIS — M54.2 CERVICAL PAIN: ICD-10-CM

## 2023-11-07 DIAGNOSIS — M79.7 FIBROMYALGIA: ICD-10-CM

## 2023-11-07 DIAGNOSIS — R06.2 WHEEZING: ICD-10-CM

## 2023-11-07 DIAGNOSIS — G89.29 CHRONIC BACK PAIN, UNSPECIFIED BACK LOCATION, UNSPECIFIED BACK PAIN LATERALITY: ICD-10-CM

## 2023-11-07 DIAGNOSIS — F17.210 CIGARETTE NICOTINE DEPENDENCE WITHOUT COMPLICATION: ICD-10-CM

## 2023-11-07 DIAGNOSIS — M54.9 CHRONIC BACK PAIN, UNSPECIFIED BACK LOCATION, UNSPECIFIED BACK PAIN LATERALITY: ICD-10-CM

## 2023-11-07 PROBLEM — N39.0 UTI (URINARY TRACT INFECTION): Status: RESOLVED | Noted: 2023-02-07 | Resolved: 2023-11-07

## 2023-11-07 PROBLEM — R49.0 HOARSENESS: Status: RESOLVED | Noted: 2023-02-07 | Resolved: 2023-11-07

## 2023-11-07 PROBLEM — N92.6 MISSED MENSES: Status: RESOLVED | Noted: 2023-10-04 | Resolved: 2023-11-07

## 2023-11-07 PROBLEM — R63.2 POLYPHAGIA: Status: RESOLVED | Noted: 2023-02-07 | Resolved: 2023-11-07

## 2023-11-07 PROBLEM — M25.50 JOINT PAIN: Status: RESOLVED | Noted: 2023-02-07 | Resolved: 2023-11-07

## 2023-11-07 PROBLEM — M54.50 LUMBAGO: Status: RESOLVED | Noted: 2023-04-01 | Resolved: 2023-11-07

## 2023-11-07 PROBLEM — H91.90 HEARING LOSS: Status: RESOLVED | Noted: 2023-02-07 | Resolved: 2023-11-07

## 2023-11-07 PROBLEM — N20.0 KIDNEY STONE ON LEFT SIDE: Status: RESOLVED | Noted: 2023-04-01 | Resolved: 2023-11-07

## 2023-11-07 PROBLEM — G97.1 SPINAL HEADACHE: Status: RESOLVED | Noted: 2023-02-07 | Resolved: 2023-11-07

## 2023-11-07 PROBLEM — R82.5 POSITIVE URINE DRUG SCREEN: Status: RESOLVED | Noted: 2023-10-04 | Resolved: 2023-11-07

## 2023-11-07 PROCEDURE — 4004F PT TOBACCO SCREEN RCVD TLK: CPT | Performed by: FAMILY MEDICINE

## 2023-11-07 PROCEDURE — 99214 OFFICE O/P EST MOD 30 MIN: CPT | Performed by: FAMILY MEDICINE

## 2023-11-07 RX ORDER — DEXTROAMPHETAMINE SACCHARATE, AMPHETAMINE ASPARTATE, DEXTROAMPHETAMINE SULFATE AND AMPHETAMINE SULFATE 5; 5; 5; 5 MG/1; MG/1; MG/1; MG/1
20 TABLET ORAL DAILY
Start: 2023-11-07 | End: 2024-05-06 | Stop reason: SDUPTHER

## 2023-11-07 RX ORDER — ALBUTEROL SULFATE 90 UG/1
2 AEROSOL, METERED RESPIRATORY (INHALATION) EVERY 4 HOURS PRN
Qty: 8 G | Refills: 1 | Status: SHIPPED | OUTPATIENT
Start: 2023-11-07 | End: 2024-11-06

## 2023-11-07 RX ORDER — OXYCODONE AND ACETAMINOPHEN 5; 325 MG/1; MG/1
1 TABLET ORAL EVERY 12 HOURS
Qty: 60 TABLET | Refills: 0 | Status: SHIPPED | OUTPATIENT
Start: 2023-11-14 | End: 2023-12-10 | Stop reason: HOSPADM

## 2023-11-07 ASSESSMENT — ENCOUNTER SYMPTOMS
SORE THROAT: 0
COUGH: 0
PALPITATIONS: 0
FREQUENCY: 0
CHEST TIGHTNESS: 0
NAUSEA: 0
SHORTNESS OF BREATH: 0
HEMATURIA: 0
NUMBNESS: 0
DYSURIA: 0
HEADACHES: 0
ADENOPATHY: 0
CONFUSION: 0
OCCASIONAL FEELINGS OF UNSTEADINESS: 1
NERVOUS/ANXIOUS: 0
DIAPHORESIS: 0
CONSTIPATION: 0
POLYPHAGIA: 0
SINUS PAIN: 0
ABDOMINAL PAIN: 0
DEPRESSION: 0
WHEEZING: 0
FEVER: 0
SINUS PRESSURE: 0
POLYDIPSIA: 0
UNEXPECTED WEIGHT CHANGE: 0
DIZZINESS: 0
DYSPHORIC MOOD: 0
CHILLS: 0
LOSS OF SENSATION IN FEET: 1
DIARRHEA: 0
VOMITING: 0
LIGHT-HEADEDNESS: 0

## 2023-11-07 ASSESSMENT — PATIENT HEALTH QUESTIONNAIRE - PHQ9
2. FEELING DOWN, DEPRESSED OR HOPELESS: NOT AT ALL
1. LITTLE INTEREST OR PLEASURE IN DOING THINGS: NOT AT ALL
SUM OF ALL RESPONSES TO PHQ9 QUESTIONS 1 AND 2: 0

## 2023-11-07 ASSESSMENT — LIFESTYLE VARIABLES: TOTAL SCORE: 3

## 2023-11-07 NOTE — PATIENT INSTRUCTIONS
Nga Phoenix ,    Thank you for coming in today. We at [facility] appreciate your trust in our care. If you have any questions or concerns about the care you received today, please do not hesitate to contact us at [phone].    The following instructions were discussed today:    - Follow up in 3 months.        Ways to Help Prevent Falls at Home    Quick Tips   ? Ask for help if you need it. Most people want to help!   ? Get up slowly after sitting or laying down   ? Wear a medical alert device or keep cell phone in your pocket   ? Use night lights, especially areas near a bathroom   ? Keep the items you use often within reach on a small stool or end table   ? Use an assistive device such as walker or cane, as directed by provider/physical therapy   ? Use a non-slip mat and grab bars in your bathroom. Look for home health sections for best options     Other Areas to Focus On   ? Exercise and nutrition: Regular exercise or taking a falls prevention class are great ways improve strength and balance. Don’t forget to stay hydrated and bring a snack!   ? Medicine side effects: Some medicines can make you sleepy or dizzy, which could cause a fall. Ask your healthcare provider about the side effects your medicines could cause. Be sure to let them know if you take any vitamins or supplements as well.   ? Tripping hazards: Remove items you could trip on, such as loose mats, rugs, cords, and clutter. Wear closed toe shoes with rubber soles.   ? Health and wellness: Get regular checkups with your healthcare provider, plus routine vision and hearing screenings. Talk with your healthcare provider about:   o Your medicines and the possible side effects - bring them in a bag if that is easier!   o Problems with balance or feeling dizzy   o Ways to promote bone health, such as Vitamin D and calcium supplements   o Questions or concerns about falling     *Ask your healthcare team if you have questions      ©[facility], 2022

## 2023-11-07 NOTE — PROGRESS NOTES
Subjective   Patient ID: Nga Phoenix is a 28 y.o. female who presents for follow up, received flu vaccine (Pain meds refill if able or she can call in if you prefer).    routine follow up. chronic issues as per assessment and plan.     She is now 32 weeks pregnant. Went to see high risk OB/GYN given her current use of opioid and stimulant medication. Note reviewed. Okay to use low dose of these medications with little risk to baby. Will also be able to breast feed post delivery.      OARRS:  Ember Morales MD on 11/7/2023  1:40 PM  I have personally reviewed the OARRS report for Nga Phoenix. I have considered the risks of abuse, dependence, addiction and diversion and I believe that it is clinically appropriate for Nga Phoenix to be prescribed this medication    Is the patient prescribed a combination of a benzodiazepine and opioid?  No    Last Urine Drug Screen / ordered today: No  Recent Results (from the past 8760 hour(s))   Opiate Confirmation, Urine    Collection Time: 07/01/23  9:38 AM   Result Value Ref Range    6-Acetylmorphine <25 Cutoff <25 ng/mL    Codeine <50 Cutoff <50 ng/mL    Hydrocodone <25 Cutoff <25 ng/mL    Hydromorphone <25 Cutoff <25 ng/mL    Morphine Urine <50 Cutoff <50 ng/mL    Norhydrocodone <25 Cutoff <25 ng/mL    Noroxycodone >1000 (A) Cutoff <25 ng/mL    Oxycodone 419 (A) Cutoff <25 ng/mL    Oxymorphone 261 (A) Cutoff <25 ng/mL   Amphetamine Confirm, Urine    Collection Time: 06/30/23  1:47 PM   Result Value Ref Range    Amphetamines,Urine >5000 ng/mL    MDA, Urine <200 ng/mL    MDEA, Urine <200 ng/mL    MDMA, Urine <200 ng/mL    Methamphetamine Quant, Ur <200 ng/mL    Phentermine,Urine <200 ng/mL   Drug Screen, Urine With Reflex to Confirmation    Collection Time: 06/30/23  1:47 PM   Result Value Ref Range    DRUG SCREEN COMMENT URINE SEE BELOW     Amphetamine Screen, Urine PRESUMPTIVE POSITIVE (A) NEGATIVE    Barbiturate Screen, Urine PRESUMPTIVE NEGATIVE NEGATIVE     BENZODIAZEPINE (PRESENCE) IN URINE BY SCREEN METHOD PRESUMPTIVE NEGATIVE NEGATIVE    Cannabinoid Screen, Urine PRESUMPTIVE NEGATIVE NEGATIVE    Cocaine Screen, Urine PRESUMPTIVE NEGATIVE NEGATIVE    Fentanyl, Ur PRESUMPTIVE NEGATIVE NEGATIVE    Methadone Screen, Urine PRESUMPTIVE NEGATIVE NEGATIVE    Opiate Screen, Urine PRESUMPTIVE NEGATIVE NEGATIVE    Oxycodone Screen, Ur PRESUMPTIVE POSITIVE (A) NEGATIVE    PCP Screen, Urine PRESUMPTIVE NEGATIVE NEGATIVE   Tapentadol Confirmation, Urine    Collection Time: 01/04/23  3:45 PM   Result Value Ref Range    Tapentadol <25 Cutoff <25 ng/mL    N-Desmethyltapentadol <25 Cutoff <25 ng/mL   Buprenorphine Confirm,Urine    Collection Time: 01/04/23  3:45 PM   Result Value Ref Range    BUPRENORPHINE GLUC, URINE <5 ng/mL    BUPRENORPHINE ,URINE <2 ng/mL    NALOXONE, URINE <100 ng/mL    NORBUPRENORPHINE GLUC,URINE <5 ng/mL    NORBUPRENORPHINE, URINE <2 ng/mL   OPIATE/OPIOID/BENZO PRESCRIPTION COMPLIANCE    Collection Time: 01/04/23  3:45 PM   Result Value Ref Range    DRUG SCREEN COMMENT URINE SEE BELOW     Creatine, Urine 66.9 mg/dL    Amphetamine Screen, Urine PRESUMPTIVE POSITIVE (A) NEGATIVE    Barbiturate Screen, Urine PRESUMPTIVE NEGATIVE NEGATIVE    Cannabinoid Screen, Urine PRESUMPTIVE NEGATIVE NEGATIVE    Cocaine Screen, Urine PRESUMPTIVE NEGATIVE NEGATIVE    PCP Screen, Urine PRESUMPTIVE NEGATIVE NEGATIVE    7-Aminoclonazepam <25 Cutoff <25 ng/mL    Alpha-Hydroxyalprazolam <25 Cutoff <25 ng/mL    Alpha-Hydroxymidazolam <25 Cutoff <25 ng/mL    Alprazolam <25 Cutoff <25 ng/mL    Chlordiazepoxide <25 Cutoff <25 ng/mL    Clonazepam <25 Cutoff <25 ng/mL    Diazepam <25 Cutoff <25 ng/mL    Lorazepam <25 Cutoff <25 ng/mL    Midazolam <25 Cutoff <25 ng/mL    Nordiazepam <25 Cutoff <25 ng/mL    Oxazepam <25 Cutoff <25 ng/mL    Temazepam <25 Cutoff <25 ng/mL    Zolpidem <25 Cutoff <25 ng/mL    Zolpidem Metabolite (ZCA) <25 Cutoff <25 ng/mL    6-Acetylmorphine <25 Cutoff <25  ng/mL    Codeine <50 Cutoff <50 ng/mL    Hydrocodone <25 Cutoff <25 ng/mL    Hydromorphone <25 Cutoff <25 ng/mL    Morphine Urine <50 Cutoff <50 ng/mL    Norhydrocodone <25 Cutoff <25 ng/mL    Noroxycodone 346 (A) Cutoff <25 ng/mL    Oxycodone 28 (A) Cutoff <25 ng/mL    Oxymorphone 128 (A) Cutoff <25 ng/mL    Tramadol <50 Cutoff <50 ng/mL    O-Desmethyltramadol <50 Cutoff <50 ng/mL    Fentanyl <2.5 Cutoff<2.5 ng/mL    Norfentanyl <2.5 Cutoff<2.5 ng/mL    METHADONE CONFIRMATION,URINE <25 Cutoff <25 ng/mL    EDDP <25 Cutoff <25 ng/mL     Results are as expected.         Controlled Substance Agreement: Opioid   Date of the Last Agreement: 5/4/23  Reviewed Controlled Substance Agreement including but not limited to the benefits, risks, and alternatives to treatment with a Controlled Substance medication(s).    Opioids:  What is the patient's goal of therapy? Reduction of pain  Is this being achieved with current treatment? Yes     I have calculated the patient's Morphine Dose Equivalent (MED): 15  I have considered referral to Pain Management and/or a specialist, and do not feel it is necessary at this time.    I feel that it is clinically indicated to continue this current medication regimen after consideration of alternative therapies, and other non-opioid treatment.    Opioid Risk Screening:  Family History of Substance Abuse  Alcohol: 0 (11/7/2023  2:00 PM)  Illegal Drugs: 0 (11/7/2023  2:00 PM)  Prescription Drugs: 0 (11/7/2023  2:00 PM)    Personal History of Substance Abuse  Alcohol: 0 (11/7/2023  2:00 PM)  Illegal Drugs: 0 (11/7/2023  2:00 PM)  Prescription Drugs: 0 (11/7/2023  2:00 PM)    Patient Age (16-45)  Age (16-45): 1 (11/7/2023  2:00 PM)    History of Preadolescent Sexual Abuse  History of Preadolescent Sexual Abuse: .0 (11/7/2023  2:00 PM)    Psychological Disease  Attention Deficit Disorder, Obsessive Compulsive Disorder, Bipolar, Schizophrenia: 2 (11/7/2023  2:00 PM)  Depression: 0 (11/7/2023  2:00  PM)    Total Score  Total Score: 3 (11/7/2023  2:00 PM)    Total Score Risk Category  TOTAL SCORE CATEGORY: Low Risk (0-3) (11/7/2023  2:00 PM)        Pain Assessment:  Analgesia  What was your pain level on average during the past week?: 5  What was your pain level at its worst during the past week?: 8  What percentage of your pain has been relieved during the past week?: 60 %  Is the amount of pain relief you are now obtaining from your current pain relievers enough to make a real difference in your life?: Y  Query to Clinician: Is the patient's pain relief clinically significant?: Yes    Activities of Daily Living  Physical Functioning: Better  Family Relationships: Better  Social Relationships: Better  Mood: Better  Sleep Patterns: Better  Overall Functioning: Better    Adverse Events  Is patient experiencing any side effects from current pain relievers?: N      Assessment  Is your overall impression that this patient is benefiting from opioid therapy?: Yes  Specific Analgesic Plan: Continue present regimen        Review of Systems   Constitutional:  Negative for chills, diaphoresis, fever and unexpected weight change.   HENT:  Negative for congestion, sinus pressure, sinus pain, sneezing and sore throat.    Respiratory:  Negative for cough, chest tightness, shortness of breath and wheezing.    Cardiovascular:  Negative for chest pain, palpitations and leg swelling.   Gastrointestinal:  Negative for abdominal pain, constipation, diarrhea, nausea and vomiting.   Endocrine: Negative for cold intolerance, heat intolerance, polydipsia, polyphagia and polyuria.   Genitourinary:  Negative for dysuria, frequency, hematuria and urgency.   Neurological:  Negative for dizziness, syncope, light-headedness, numbness and headaches.   Hematological:  Negative for adenopathy.   Psychiatric/Behavioral:  Negative for confusion and dysphoric mood. The patient is not nervous/anxious.        Objective   /64 (BP Location:  "Right arm, Patient Position: Sitting, BP Cuff Size: Adult)   Pulse 100   Temp 36.7 °C (98 °F)   Resp 16   Ht 1.6 m (5' 3\")   Wt 59 kg (130 lb)   LMP 02/15/2023   SpO2 97%   BMI 23.03 kg/m²     Physical Exam  Vitals and nursing note reviewed.   Constitutional:       General: She is not in acute distress.     Appearance: Normal appearance.   HENT:      Head: Normocephalic and atraumatic.      Nose: Nose normal.   Eyes:      Extraocular Movements: Extraocular movements intact.      Conjunctiva/sclera: Conjunctivae normal.      Pupils: Pupils are equal, round, and reactive to light.   Cardiovascular:      Rate and Rhythm: Normal rate and regular rhythm.      Heart sounds: No murmur heard.     No friction rub. No gallop.   Pulmonary:      Effort: Pulmonary effort is normal.      Breath sounds: Normal breath sounds. No wheezing, rhonchi or rales.   Abdominal:      General: Bowel sounds are normal. There is no distension.      Palpations: Abdomen is soft.      Tenderness: There is no abdominal tenderness.   Musculoskeletal:         General: Normal range of motion.      Cervical back: Normal range of motion and neck supple.   Skin:     General: Skin is warm and dry.   Neurological:      General: No focal deficit present.      Mental Status: She is alert and oriented to person, place, and time.      Deep Tendon Reflexes: Reflexes normal.   Psychiatric:         Mood and Affect: Mood normal.         Behavior: Behavior normal.         Thought Content: Thought content normal.         Judgment: Judgment normal.         Assessment/Plan   Problem List Items Addressed This Visit             ICD-10-CM    Adult ADHD (attention deficit hyperactivity disorder) - Primary F90.9     - follows with psychiatry. on adderall  - saw high risk OB and stated okay to continue         Relevant Medications    amphetamine-dextroamphetamine (Adderall) 20 mg tablet    Back pain, chronic M54.9, G89.29     - controlled on oxycodone/ " acetaminophen  - saw high risk OB and stated okay to continue         Relevant Medications    oxyCODONE-acetaminophen (Percocet) 5-325 mg tablet (Start on 11/14/2023)    Cervical pain M54.2     - controlled on oxycodone/ acetaminophen  - saw high risk OB and stated okay to continue         Relevant Medications    oxyCODONE-acetaminophen (Percocet) 5-325 mg tablet (Start on 11/14/2023)    Chronic thoracic spine pain M54.6, G89.29     - controlled on oxycodone/ acetaminophen  - saw high risk OB and stated okay to continue         Relevant Medications    oxyCODONE-acetaminophen (Percocet) 5-325 mg tablet (Start on 11/14/2023)    Cigarette nicotine dependence without complication F17.210     - encouraged cessation         Drug exposure, gestational P04.9     - opioid and stimulant  - saw high risk OB and states okay to continue both         Fibromyalgia M79.7     - controlled on oxycodone/ acetaminophen  - saw high risk OB and stated okay to continue         Relevant Medications    oxyCODONE-acetaminophen (Percocet) 5-325 mg tablet (Start on 11/14/2023)    Multiple thyroid nodules E04.2     - seen on US 9/2021  - per report, repeat US at 1, 2, 3, and 5 years  - overdue for repeat US. ordered again today         Relevant Orders    US thyroid    Wheezing R06.2     - secondary to allergies  - worse due to pregnancy          Relevant Medications    albuterol (Ventolin HFA) 90 mcg/actuation inhaler        Patient was identified as a fall risk. Risk prevention instructions provided.

## 2023-11-07 NOTE — ASSESSMENT & PLAN NOTE
- seen on US 9/2021  - per report, repeat US at 1, 2, 3, and 5 years  - overdue for repeat US. ordered again today

## 2023-11-09 ENCOUNTER — ROUTINE PRENATAL (OUTPATIENT)
Dept: OBSTETRICS AND GYNECOLOGY | Facility: CLINIC | Age: 28
End: 2023-11-09
Payer: COMMERCIAL

## 2023-11-09 VITALS — DIASTOLIC BLOOD PRESSURE: 72 MMHG | WEIGHT: 129 LBS | SYSTOLIC BLOOD PRESSURE: 118 MMHG | BODY MASS INDEX: 22.85 KG/M2

## 2023-11-09 DIAGNOSIS — Z3A.32 32 WEEKS GESTATION OF PREGNANCY (HHS-HCC): Primary | ICD-10-CM

## 2023-11-09 DIAGNOSIS — O99.013 ANEMIA DURING PREGNANCY IN THIRD TRIMESTER (HHS-HCC): ICD-10-CM

## 2023-11-09 DIAGNOSIS — E07.9 THYROID DYSFUNCTION: ICD-10-CM

## 2023-11-09 PROCEDURE — 99213 OFFICE O/P EST LOW 20 MIN: CPT | Performed by: OBSTETRICS & GYNECOLOGY

## 2023-11-09 NOTE — PROGRESS NOTES
"Subjective   Patient ID [id number]   Nga Phoenix is a 28 y.o.  at 32w3d with a working estimated date of delivery of 2024, by Ultrasound who presents for a routine prenatal visit. She denies vaginal bleeding, leakage of fluid, decreased fetal movements, or contractions.    Her pregnancy is complicated by:  late  delivery x 2 , FTVD x 2    Objective   Physical Exam:   Weight: 58.5 kg (129 lb)  Expected Total Weight Gain: 11.5 kg (25 lb)-16 kg (35 lb)   Pregravid BMI: 19.84  BP: 118/72                  Prenatal Labs  Urine Dip:  Lab Results   Component Value Date    KETONESU NEGATIVE 10/27/2023     Lab Results   Component Value Date    HGB 10.1 (L) 10/13/2023    HCT 30.7 (L) 10/13/2023    ABO A 2023    HEPBSAG NONREACTIVE 2023     No results found for: \"PAPPA\", \"AFP\", \"HCG\", \"ESTRIOL\", \"INHBA\"  No results found for: \"GLUF\", \"GLUT1\", \"GUXEEDN2CQ\", \"GANEPOX6CR\"           Assessment/Plan   Problem List Items Addressed This Visit             ICD-10-CM    Anemia during pregnancy in third trimester O99.013    32 weeks gestation of pregnancy - Primary Z3A.32     Continue prenatal vitamin.  Labs reviewed.  Expected mode of delivery vaginal   Follow up in 2 week for a routine prenatal visit.  "

## 2023-11-16 ENCOUNTER — ANCILLARY PROCEDURE (OUTPATIENT)
Dept: RADIOLOGY | Facility: CLINIC | Age: 28
End: 2023-11-16
Payer: COMMERCIAL

## 2023-11-16 DIAGNOSIS — E04.2 MULTIPLE THYROID NODULES: ICD-10-CM

## 2023-11-16 PROCEDURE — 76536 US EXAM OF HEAD AND NECK: CPT | Performed by: STUDENT IN AN ORGANIZED HEALTH CARE EDUCATION/TRAINING PROGRAM

## 2023-11-16 PROCEDURE — 76536 US EXAM OF HEAD AND NECK: CPT

## 2023-11-21 ENCOUNTER — OFFICE VISIT (OUTPATIENT)
Dept: OBSTETRICS AND GYNECOLOGY | Facility: CLINIC | Age: 28
End: 2023-11-21
Payer: COMMERCIAL

## 2023-11-21 VITALS — SYSTOLIC BLOOD PRESSURE: 120 MMHG | DIASTOLIC BLOOD PRESSURE: 78 MMHG | WEIGHT: 132 LBS | BODY MASS INDEX: 23.38 KG/M2

## 2023-11-21 DIAGNOSIS — Z3A.34 34 WEEKS GESTATION OF PREGNANCY (HHS-HCC): Primary | ICD-10-CM

## 2023-11-21 DIAGNOSIS — F17.210 CIGARETTE NICOTINE DEPENDENCE WITHOUT COMPLICATION: ICD-10-CM

## 2023-11-21 DIAGNOSIS — O99.013 ANEMIA DURING PREGNANCY IN THIRD TRIMESTER (HHS-HCC): ICD-10-CM

## 2023-11-21 PROCEDURE — 99213 OFFICE O/P EST LOW 20 MIN: CPT | Performed by: OBSTETRICS & GYNECOLOGY

## 2023-11-21 PROCEDURE — 4004F PT TOBACCO SCREEN RCVD TLK: CPT | Performed by: OBSTETRICS & GYNECOLOGY

## 2023-11-21 NOTE — PROGRESS NOTES
"Subjective   Patient ID [id number]   Nga Phoenix is a 28 y.o.  at 34w1d with a working estimated date of delivery of 2024, by Ultrasound who presents for a routine prenatal visit. She denies vaginal bleeding, leakage of fluid, decreased fetal movements, or contractions.    Her pregnancy is complicated by:  Narcotic use for chronic back pain    Objective   Physical Exam:   Weight: 59.9 kg (132 lb)  Expected Total Weight Gain: 11.5 kg (25 lb)-16 kg (35 lb)   Pregravid BMI: 19.84  BP: 120/78                  Prenatal Labs  Urine Dip:  Lab Results   Component Value Date    KETONESU NEGATIVE 10/27/2023     Lab Results   Component Value Date    HGB 10.1 (L) 10/13/2023    HCT 30.7 (L) 10/13/2023    ABO A 2023    HEPBSAG NONREACTIVE 2023     No results found for: \"PAPPA\", \"AFP\", \"HCG\", \"ESTRIOL\", \"INHBA\"  No results found for: \"GLUF\", \"GLUT1\", \"LQLOGHE1FN\", \"WXLWHLO4FC\"    Imaging  The most recent ultrasound was performed on   with a study GA of   and EFW of  .          Assessment/Plan   Problem List Items Addressed This Visit             ICD-10-CM    Anemia during pregnancy in third trimester O99.013    Cigarette nicotine dependence without complication F17.210    Drug exposure, gestational P04.9    34 weeks gestation of pregnancy - Primary Z3A.34     Continue prenatal vitamin.  Chronic narcotic use due to chronic low back pain and fibromyalgia, she was evaluated by Dr. Syl Glasgow, who recommends delivery at [facility] at next visit  Expected mode of delivery vaginal   Follow up in 2 week for a routine prenatal visit.  "

## 2023-12-06 ENCOUNTER — ANCILLARY PROCEDURE (OUTPATIENT)
Dept: RADIOLOGY | Facility: CLINIC | Age: 28
End: 2023-12-06
Payer: COMMERCIAL

## 2023-12-06 DIAGNOSIS — Z36.4 ULTRASOUND FOR ANTENATAL SCREENING FOR FETAL GROWTH RESTRICTION (HHS-HCC): ICD-10-CM

## 2023-12-06 PROCEDURE — 76816 OB US FOLLOW-UP PER FETUS: CPT

## 2023-12-06 PROCEDURE — 76819 FETAL BIOPHYS PROFIL W/O NST: CPT | Performed by: OBSTETRICS & GYNECOLOGY

## 2023-12-06 PROCEDURE — 76816 OB US FOLLOW-UP PER FETUS: CPT | Performed by: OBSTETRICS & GYNECOLOGY

## 2023-12-07 ENCOUNTER — ROUTINE PRENATAL (OUTPATIENT)
Dept: OBSTETRICS AND GYNECOLOGY | Facility: CLINIC | Age: 28
End: 2023-12-07
Payer: COMMERCIAL

## 2023-12-07 VITALS — SYSTOLIC BLOOD PRESSURE: 130 MMHG | WEIGHT: 137 LBS | DIASTOLIC BLOOD PRESSURE: 84 MMHG | BODY MASS INDEX: 24.27 KG/M2

## 2023-12-07 DIAGNOSIS — Z36.9 ANTENATAL SCREENING ENCOUNTER (HHS-HCC): ICD-10-CM

## 2023-12-07 DIAGNOSIS — F17.210 CIGARETTE NICOTINE DEPENDENCE WITHOUT COMPLICATION: ICD-10-CM

## 2023-12-07 DIAGNOSIS — Z3A.36 36 WEEKS GESTATION OF PREGNANCY (HHS-HCC): ICD-10-CM

## 2023-12-07 DIAGNOSIS — O99.013 ANEMIA DURING PREGNANCY IN THIRD TRIMESTER (HHS-HCC): Primary | ICD-10-CM

## 2023-12-07 PROCEDURE — 99213 OFFICE O/P EST LOW 20 MIN: CPT | Performed by: OBSTETRICS & GYNECOLOGY

## 2023-12-07 PROCEDURE — 87081 CULTURE SCREEN ONLY: CPT

## 2023-12-07 NOTE — PROGRESS NOTES
"Subjective   Patient ID [id number]   Nga Phoenix is a 28 y.o.  at 36w3d with a working estimated date of delivery of 2024, by Ultrasound who presents for a routine prenatal visit. She denies vaginal bleeding, leakage of fluid, decreased fetal movements, or contractions.    Her pregnancy is complicated by:  Tobacco use disorder    Objective   Physical Exam  Weight: 62.1 kg (137 lb)  Expected Total Weight Gain: 11.5 kg (25 lb)-16 kg (35 lb)   Pregravid BMI: 19.84  BP: 130/84  Fetal Heart Rate: 132 Fundal Height (cm): 34 cm    Prenatal Labs  Urine dip:  Lab Results   Component Value Date    KETONESU NEGATIVE 10/27/2023       Lab Results   Component Value Date    HGB 10.1 (L) 10/13/2023    HCT 30.7 (L) 10/13/2023    ABO A 2023    HEPBSAG NONREACTIVE 2023     No results found for: \"PAPPA\", \"AFP\", \"HCG\", \"ESTRIOL\", \"INHBA\"  No results found for: \"GLUF\", \"GLUT1\", \"FOOFPWJ8DN\", \"WAZOFHV3CD\"    Imaging  The most recent ultrasound was performed on   with a study GA of   and EFW of  .          Assessment/Plan   Problem List Items Addressed This Visit             ICD-10-CM    Anemia during pregnancy in third trimester - Primary O99.013    Cigarette nicotine dependence without complication F17.210    36 weeks gestation of pregnancy Z3A.36       Continue prenatal vitamin.  GBS done  Will schedule IOL on 23  Follow up in 1 weeks for a routine prenatal visit.  "

## 2023-12-08 ENCOUNTER — HOSPITAL ENCOUNTER (INPATIENT)
Facility: HOSPITAL | Age: 28
LOS: 2 days | Discharge: HOME | End: 2023-12-10
Attending: OBSTETRICS & GYNECOLOGY | Admitting: OBSTETRICS & GYNECOLOGY
Payer: COMMERCIAL

## 2023-12-08 PROCEDURE — 2500000004 HC RX 250 GENERAL PHARMACY W/ HCPCS (ALT 636 FOR OP/ED)

## 2023-12-08 PROCEDURE — 85027 COMPLETE CBC AUTOMATED: CPT | Performed by: OBSTETRICS & GYNECOLOGY

## 2023-12-08 PROCEDURE — 86900 BLOOD TYPING SEROLOGIC ABO: CPT | Performed by: OBSTETRICS & GYNECOLOGY

## 2023-12-08 PROCEDURE — 96372 THER/PROPH/DIAG INJ SC/IM: CPT

## 2023-12-08 PROCEDURE — 1120000001 HC OB PRIVATE ROOM DAILY

## 2023-12-08 PROCEDURE — 10907ZC DRAINAGE OF AMNIOTIC FLUID, THERAPEUTIC FROM PRODUCTS OF CONCEPTION, VIA NATURAL OR ARTIFICIAL OPENING: ICD-10-PCS | Performed by: OBSTETRICS & GYNECOLOGY

## 2023-12-08 PROCEDURE — 59409 OBSTETRICAL CARE: CPT | Performed by: OBSTETRICS & GYNECOLOGY

## 2023-12-08 PROCEDURE — 86901 BLOOD TYPING SEROLOGIC RH(D): CPT | Performed by: OBSTETRICS & GYNECOLOGY

## 2023-12-08 PROCEDURE — 36415 COLL VENOUS BLD VENIPUNCTURE: CPT | Performed by: OBSTETRICS & GYNECOLOGY

## 2023-12-08 PROCEDURE — 86780 TREPONEMA PALLIDUM: CPT | Mod: GEALAB | Performed by: OBSTETRICS & GYNECOLOGY

## 2023-12-08 RX ORDER — ONDANSETRON 4 MG/1
4 TABLET, FILM COATED ORAL EVERY 6 HOURS PRN
Status: DISCONTINUED | OUTPATIENT
Start: 2023-12-08 | End: 2023-12-09

## 2023-12-08 RX ORDER — BUTORPHANOL TARTRATE 2 MG/ML
1 INJECTION INTRAMUSCULAR; INTRAVENOUS EVERY 10 MIN PRN
Status: DISCONTINUED | OUTPATIENT
Start: 2023-12-08 | End: 2023-12-09

## 2023-12-08 RX ORDER — BETAMETHASONE SODIUM PHOSPHATE AND BETAMETHASONE ACETATE 3; 3 MG/ML; MG/ML
12 INJECTION, SUSPENSION INTRA-ARTICULAR; INTRALESIONAL; INTRAMUSCULAR; SOFT TISSUE ONCE
Status: COMPLETED | OUTPATIENT
Start: 2023-12-08 | End: 2023-12-08

## 2023-12-08 RX ORDER — METOCLOPRAMIDE HYDROCHLORIDE 5 MG/ML
10 INJECTION INTRAMUSCULAR; INTRAVENOUS EVERY 6 HOURS PRN
Status: DISCONTINUED | OUTPATIENT
Start: 2023-12-08 | End: 2023-12-09

## 2023-12-08 RX ORDER — NIFEDIPINE 10 MG/1
10 CAPSULE ORAL ONCE AS NEEDED
Status: DISCONTINUED | OUTPATIENT
Start: 2023-12-08 | End: 2023-12-09

## 2023-12-08 RX ORDER — SODIUM CHLORIDE, SODIUM LACTATE, POTASSIUM CHLORIDE, CALCIUM CHLORIDE 600; 310; 30; 20 MG/100ML; MG/100ML; MG/100ML; MG/100ML
125 INJECTION, SOLUTION INTRAVENOUS CONTINUOUS
Status: DISCONTINUED | OUTPATIENT
Start: 2023-12-08 | End: 2023-12-09

## 2023-12-08 RX ORDER — TERBUTALINE SULFATE 1 MG/ML
0.25 INJECTION SUBCUTANEOUS ONCE AS NEEDED
Status: DISCONTINUED | OUTPATIENT
Start: 2023-12-08 | End: 2023-12-09

## 2023-12-08 RX ORDER — ERYTHROMYCIN 5 MG/G
OINTMENT OPHTHALMIC
Status: DISPENSED
Start: 2023-12-08 | End: 2023-12-09

## 2023-12-08 RX ORDER — MISOPROSTOL 200 UG/1
800 TABLET ORAL ONCE AS NEEDED
Status: DISCONTINUED | OUTPATIENT
Start: 2023-12-08 | End: 2023-12-09

## 2023-12-08 RX ORDER — BETAMETHASONE SODIUM PHOSPHATE AND BETAMETHASONE ACETATE 3; 3 MG/ML; MG/ML
INJECTION, SUSPENSION INTRA-ARTICULAR; INTRALESIONAL; INTRAMUSCULAR; SOFT TISSUE
Status: COMPLETED
Start: 2023-12-08 | End: 2023-12-08

## 2023-12-08 RX ORDER — CARBOPROST TROMETHAMINE 250 UG/ML
250 INJECTION, SOLUTION INTRAMUSCULAR ONCE AS NEEDED
Status: DISCONTINUED | OUTPATIENT
Start: 2023-12-08 | End: 2023-12-09

## 2023-12-08 RX ORDER — OXYTOCIN/0.9 % SODIUM CHLORIDE 30/500 ML
60 PLASTIC BAG, INJECTION (ML) INTRAVENOUS ONCE AS NEEDED
Status: DISCONTINUED | OUTPATIENT
Start: 2023-12-08 | End: 2023-12-09

## 2023-12-08 RX ORDER — METOCLOPRAMIDE 10 MG/1
10 TABLET ORAL EVERY 6 HOURS PRN
Status: DISCONTINUED | OUTPATIENT
Start: 2023-12-08 | End: 2023-12-09

## 2023-12-08 RX ORDER — LIDOCAINE HYDROCHLORIDE 10 MG/ML
30 INJECTION INFILTRATION; PERINEURAL ONCE AS NEEDED
Status: DISCONTINUED | OUTPATIENT
Start: 2023-12-08 | End: 2023-12-09

## 2023-12-08 RX ORDER — HYDRALAZINE HYDROCHLORIDE 20 MG/ML
5 INJECTION INTRAMUSCULAR; INTRAVENOUS ONCE AS NEEDED
Status: DISCONTINUED | OUTPATIENT
Start: 2023-12-08 | End: 2023-12-09

## 2023-12-08 RX ORDER — VANCOMYCIN HYDROCHLORIDE 1 G/200ML
1 INJECTION, SOLUTION INTRAVENOUS ONCE
Status: DISCONTINUED | OUTPATIENT
Start: 2023-12-08 | End: 2023-12-09

## 2023-12-08 RX ORDER — ONDANSETRON HYDROCHLORIDE 2 MG/ML
4 INJECTION, SOLUTION INTRAVENOUS EVERY 6 HOURS PRN
Status: DISCONTINUED | OUTPATIENT
Start: 2023-12-08 | End: 2023-12-09

## 2023-12-08 RX ORDER — NALBUPHINE HYDROCHLORIDE 10 MG/ML
10 INJECTION, SOLUTION INTRAMUSCULAR; INTRAVENOUS; SUBCUTANEOUS
Status: DISCONTINUED | OUTPATIENT
Start: 2023-12-08 | End: 2023-12-09

## 2023-12-08 RX ORDER — PHYTONADIONE 1 MG/.5ML
INJECTION, EMULSION INTRAMUSCULAR; INTRAVENOUS; SUBCUTANEOUS
Status: DISPENSED
Start: 2023-12-08 | End: 2023-12-09

## 2023-12-08 RX ORDER — METHYLERGONOVINE MALEATE 0.2 MG/ML
0.2 INJECTION INTRAVENOUS ONCE AS NEEDED
Status: DISCONTINUED | OUTPATIENT
Start: 2023-12-08 | End: 2023-12-09

## 2023-12-08 RX ORDER — LABETALOL HYDROCHLORIDE 5 MG/ML
20 INJECTION, SOLUTION INTRAVENOUS ONCE AS NEEDED
Status: DISCONTINUED | OUTPATIENT
Start: 2023-12-08 | End: 2023-12-09

## 2023-12-08 RX ORDER — OXYTOCIN 10 [USP'U]/ML
10 INJECTION, SOLUTION INTRAMUSCULAR; INTRAVENOUS ONCE AS NEEDED
Status: DISCONTINUED | OUTPATIENT
Start: 2023-12-08 | End: 2023-12-09

## 2023-12-08 RX ORDER — OXYTOCIN/0.9 % SODIUM CHLORIDE 30/500 ML
PLASTIC BAG, INJECTION (ML) INTRAVENOUS
Status: DISPENSED
Start: 2023-12-08 | End: 2023-12-09

## 2023-12-08 RX ORDER — LOPERAMIDE HYDROCHLORIDE 2 MG/1
4 CAPSULE ORAL EVERY 2 HOUR PRN
Status: DISCONTINUED | OUTPATIENT
Start: 2023-12-08 | End: 2023-12-09

## 2023-12-08 RX ADMIN — BETAMETHASONE SODIUM PHOSPHATE AND BETAMETHASONE ACETATE 12 MG: 3; 3 INJECTION, SUSPENSION INTRA-ARTICULAR; INTRALESIONAL; INTRAMUSCULAR at 22:55

## 2023-12-08 RX ADMIN — BETAMETHASONE SODIUM PHOSPHATE AND BETAMETHASONE ACETATE 12 MG: 3; 3 INJECTION, SUSPENSION INTRA-ARTICULAR; INTRALESIONAL; INTRAMUSCULAR; SOFT TISSUE at 22:55

## 2023-12-08 SDOH — SOCIAL STABILITY: SOCIAL INSECURITY: DO YOU FEEL ANYONE HAS EXPLOITED OR TAKEN ADVANTAGE OF YOU FINANCIALLY OR OF YOUR PERSONAL PROPERTY?: NO

## 2023-12-08 SDOH — HEALTH STABILITY: MENTAL HEALTH: WERE YOU ABLE TO COMPLETE ALL THE BEHAVIORAL HEALTH SCREENINGS?: YES

## 2023-12-08 SDOH — HEALTH STABILITY: MENTAL HEALTH: WISH TO BE DEAD (PAST 1 MONTH): NO

## 2023-12-08 SDOH — HEALTH STABILITY: MENTAL HEALTH: HAVE YOU USED ANY PRESCRIPTION DRUGS OTHER THAN PRESCRIBED IN THE PAST 12 MONTHS?: YES

## 2023-12-08 SDOH — SOCIAL STABILITY: SOCIAL INSECURITY: ABUSE SCREEN: ADULT

## 2023-12-08 SDOH — SOCIAL STABILITY: SOCIAL INSECURITY: HAS ANYONE EVER THREATENED TO HURT YOUR FAMILY OR YOUR PETS?: NO

## 2023-12-08 SDOH — SOCIAL STABILITY: SOCIAL INSECURITY: HAVE YOU HAD THOUGHTS OF HARMING ANYONE ELSE?: NO

## 2023-12-08 SDOH — SOCIAL STABILITY: SOCIAL INSECURITY: DOES ANYONE TRY TO KEEP YOU FROM HAVING/CONTACTING OTHER FRIENDS OR DOING THINGS OUTSIDE YOUR HOME?: NO

## 2023-12-08 SDOH — HEALTH STABILITY: MENTAL HEALTH: SUICIDAL BEHAVIOR (LIFETIME): NO

## 2023-12-08 SDOH — SOCIAL STABILITY: SOCIAL INSECURITY: ARE THERE ANY APPARENT SIGNS OF INJURIES/BEHAVIORS THAT COULD BE RELATED TO ABUSE/NEGLECT?: NO

## 2023-12-08 SDOH — SOCIAL STABILITY: SOCIAL INSECURITY: PHYSICAL ABUSE: YES, PAST (COMMENT)

## 2023-12-08 SDOH — HEALTH STABILITY: MENTAL HEALTH: HAVE YOU USED ANY SUBSTANCES (CANABIS, COCAINE, HEROIN, HALLUCINOGENS, INHALANTS, ETC.) IN THE PAST 12 MONTHS?: YES

## 2023-12-08 SDOH — SOCIAL STABILITY: SOCIAL INSECURITY: ARE YOU OR HAVE YOU BEEN THREATENED OR ABUSED PHYSICALLY, EMOTIONALLY, OR SEXUALLY BY ANYONE?: YES

## 2023-12-08 SDOH — ECONOMIC STABILITY: HOUSING INSECURITY: DO YOU FEEL UNSAFE GOING BACK TO THE PLACE WHERE YOU ARE LIVING?: NO

## 2023-12-08 SDOH — SOCIAL STABILITY: SOCIAL INSECURITY: VERBAL ABUSE: YES, PAST (COMMENT)

## 2023-12-08 SDOH — HEALTH STABILITY: MENTAL HEALTH: NON-SPECIFIC ACTIVE SUICIDAL THOUGHTS (PAST 1 MONTH): NO

## 2023-12-08 ASSESSMENT — ACTIVITIES OF DAILY LIVING (ADL)
WALKS IN HOME: INDEPENDENT
TOILETING: INDEPENDENT
HEARING - LEFT EAR: FUNCTIONAL
HEARING - RIGHT EAR: FUNCTIONAL
ADEQUATE_TO_COMPLETE_ADL: YES
JUDGMENT_ADEQUATE_SAFELY_COMPLETE_DAILY_ACTIVITIES: YES
PATIENT'S MEMORY ADEQUATE TO SAFELY COMPLETE DAILY ACTIVITIES?: YES
FEEDING YOURSELF: INDEPENDENT
GROOMING: INDEPENDENT
DRESSING YOURSELF: INDEPENDENT
BATHING: INDEPENDENT

## 2023-12-08 ASSESSMENT — LIFESTYLE VARIABLES
AUDIT-C TOTAL SCORE: 0
HOW MANY STANDARD DRINKS CONTAINING ALCOHOL DO YOU HAVE ON A TYPICAL DAY: PATIENT DOES NOT DRINK
HOW OFTEN DO YOU HAVE 6 OR MORE DRINKS ON ONE OCCASION: NEVER
SKIP TO QUESTIONS 9-10: 1
AUDIT-C TOTAL SCORE: 0
HOW OFTEN DO YOU HAVE A DRINK CONTAINING ALCOHOL: NEVER

## 2023-12-08 ASSESSMENT — PATIENT HEALTH QUESTIONNAIRE - PHQ9
1. LITTLE INTEREST OR PLEASURE IN DOING THINGS: NOT AT ALL
2. FEELING DOWN, DEPRESSED OR HOPELESS: NOT AT ALL
SUM OF ALL RESPONSES TO PHQ9 QUESTIONS 1 & 2: 0

## 2023-12-08 ASSESSMENT — PAIN SCALES - GENERAL: PAINLEVEL_OUTOF10: 10 - WORST POSSIBLE PAIN

## 2023-12-09 LAB
ABO GROUP (TYPE) IN BLOOD: NORMAL
ALBUMIN SERPL BCP-MCNC: 3.1 G/DL (ref 3.4–5)
ALP SERPL-CCNC: 169 U/L (ref 33–110)
ALT SERPL W P-5'-P-CCNC: 15 U/L (ref 7–45)
ANION GAP SERPL CALC-SCNC: 12 MMOL/L (ref 10–20)
ANTIBODY SCREEN: NORMAL
AST SERPL W P-5'-P-CCNC: 24 U/L (ref 9–39)
BILIRUB SERPL-MCNC: 0.2 MG/DL (ref 0–1.2)
BUN SERPL-MCNC: 8 MG/DL (ref 6–23)
CALCIUM SERPL-MCNC: 7.8 MG/DL (ref 8.6–10.3)
CHLORIDE SERPL-SCNC: 108 MMOL/L (ref 98–107)
CO2 SERPL-SCNC: 24 MMOL/L (ref 21–32)
CREAT SERPL-MCNC: 0.65 MG/DL (ref 0.5–1.05)
ERYTHROCYTE [DISTWIDTH] IN BLOOD BY AUTOMATED COUNT: 13 % (ref 11.5–14.5)
GFR SERPL CREATININE-BSD FRML MDRD: >90 ML/MIN/1.73M*2
GLUCOSE SERPL-MCNC: 64 MG/DL (ref 74–99)
HCT VFR BLD AUTO: 32 % (ref 36–46)
HGB BLD-MCNC: 10.7 G/DL (ref 12–16)
MCH RBC QN AUTO: 33.1 PG (ref 26–34)
MCHC RBC AUTO-ENTMCNC: 33.4 G/DL (ref 32–36)
MCV RBC AUTO: 99 FL (ref 80–100)
NRBC BLD-RTO: 0.2 /100 WBCS (ref 0–0)
PLATELET # BLD AUTO: 260 X10*3/UL (ref 150–450)
POTASSIUM SERPL-SCNC: 3.8 MMOL/L (ref 3.5–5.3)
PROT SERPL-MCNC: 5.7 G/DL (ref 6.4–8.2)
RBC # BLD AUTO: 3.23 X10*6/UL (ref 4–5.2)
RH FACTOR (ANTIGEN D): NORMAL
SODIUM SERPL-SCNC: 140 MMOL/L (ref 136–145)
T PALLIDUM AB SER QL: NONREACTIVE
WBC # BLD AUTO: 9.5 X10*3/UL (ref 4.4–11.3)

## 2023-12-09 PROCEDURE — 36415 COLL VENOUS BLD VENIPUNCTURE: CPT | Performed by: OBSTETRICS & GYNECOLOGY

## 2023-12-09 PROCEDURE — 2500000001 HC RX 250 WO HCPCS SELF ADMINISTERED DRUGS (ALT 637 FOR MEDICARE OP): Performed by: OBSTETRICS & GYNECOLOGY

## 2023-12-09 PROCEDURE — 2060000001 HC INTERMEDIATE ICU ROOM DAILY

## 2023-12-09 PROCEDURE — 84075 ASSAY ALKALINE PHOSPHATASE: CPT | Performed by: OBSTETRICS & GYNECOLOGY

## 2023-12-09 PROCEDURE — 7210000002 HC LABOR PER HOUR

## 2023-12-09 PROCEDURE — 59410 OBSTETRICAL CARE: CPT | Performed by: OBSTETRICS & GYNECOLOGY

## 2023-12-09 PROCEDURE — 7100000016 HC LABOR RECOVERY PER HOUR

## 2023-12-09 PROCEDURE — 59050 FETAL MONITOR W/REPORT: CPT

## 2023-12-09 RX ORDER — ACETAMINOPHEN 325 MG/1
975 TABLET ORAL EVERY 6 HOURS PRN
Status: DISCONTINUED | OUTPATIENT
Start: 2023-12-09 | End: 2023-12-09

## 2023-12-09 RX ORDER — DEXTROAMPHETAMINE SACCHARATE, AMPHETAMINE ASPARTATE, DEXTROAMPHETAMINE SULFATE AND AMPHETAMINE SULFATE 2.5; 2.5; 2.5; 2.5 MG/1; MG/1; MG/1; MG/1
20 TABLET ORAL DAILY
Status: DISCONTINUED | OUTPATIENT
Start: 2023-12-09 | End: 2023-12-10 | Stop reason: HOSPADM

## 2023-12-09 RX ORDER — MAGNESIUM HYDROXIDE 2400 MG/10ML
10 SUSPENSION ORAL
Status: DISCONTINUED | OUTPATIENT
Start: 2023-12-09 | End: 2023-12-10 | Stop reason: HOSPADM

## 2023-12-09 RX ORDER — OXYTOCIN/0.9 % SODIUM CHLORIDE 30/500 ML
60 PLASTIC BAG, INJECTION (ML) INTRAVENOUS ONCE AS NEEDED
Status: DISCONTINUED | OUTPATIENT
Start: 2023-12-09 | End: 2023-12-10 | Stop reason: HOSPADM

## 2023-12-09 RX ORDER — ACETAMINOPHEN 325 MG/1
975 TABLET ORAL EVERY 6 HOURS
Status: DISCONTINUED | OUTPATIENT
Start: 2023-12-09 | End: 2023-12-09

## 2023-12-09 RX ORDER — TRANEXAMIC ACID 100 MG/ML
1000 INJECTION, SOLUTION INTRAVENOUS ONCE AS NEEDED
Status: DISCONTINUED | OUTPATIENT
Start: 2023-12-09 | End: 2023-12-10 | Stop reason: HOSPADM

## 2023-12-09 RX ORDER — MISOPROSTOL 200 UG/1
800 TABLET ORAL ONCE AS NEEDED
Status: DISCONTINUED | OUTPATIENT
Start: 2023-12-09 | End: 2023-12-10 | Stop reason: HOSPADM

## 2023-12-09 RX ORDER — FERROUS SULFATE 325(65) MG
1 TABLET ORAL DAILY
Status: DISCONTINUED | OUTPATIENT
Start: 2023-12-09 | End: 2023-12-10 | Stop reason: HOSPADM

## 2023-12-09 RX ORDER — IBUPROFEN 600 MG/1
600 TABLET ORAL EVERY 6 HOURS PRN
Status: DISCONTINUED | OUTPATIENT
Start: 2023-12-09 | End: 2023-12-10 | Stop reason: HOSPADM

## 2023-12-09 RX ORDER — DIPHENHYDRAMINE HYDROCHLORIDE 50 MG/ML
25 INJECTION INTRAMUSCULAR; INTRAVENOUS EVERY 6 HOURS PRN
Status: DISCONTINUED | OUTPATIENT
Start: 2023-12-09 | End: 2023-12-10 | Stop reason: HOSPADM

## 2023-12-09 RX ORDER — CARBOPROST TROMETHAMINE 250 UG/ML
250 INJECTION, SOLUTION INTRAMUSCULAR ONCE AS NEEDED
Status: DISCONTINUED | OUTPATIENT
Start: 2023-12-09 | End: 2023-12-10 | Stop reason: HOSPADM

## 2023-12-09 RX ORDER — NIFEDIPINE 10 MG/1
10 CAPSULE ORAL ONCE AS NEEDED
Status: DISCONTINUED | OUTPATIENT
Start: 2023-12-09 | End: 2023-12-10 | Stop reason: HOSPADM

## 2023-12-09 RX ORDER — IBUPROFEN 600 MG/1
600 TABLET ORAL EVERY 6 HOURS PRN
Status: DISCONTINUED | OUTPATIENT
Start: 2023-12-09 | End: 2023-12-09

## 2023-12-09 RX ORDER — ACETAMINOPHEN 325 MG/1
650 TABLET ORAL EVERY 4 HOURS PRN
Status: DISCONTINUED | OUTPATIENT
Start: 2023-12-09 | End: 2023-12-10 | Stop reason: HOSPADM

## 2023-12-09 RX ORDER — HYDRALAZINE HYDROCHLORIDE 20 MG/ML
5 INJECTION INTRAMUSCULAR; INTRAVENOUS ONCE AS NEEDED
Status: DISCONTINUED | OUTPATIENT
Start: 2023-12-09 | End: 2023-12-10 | Stop reason: HOSPADM

## 2023-12-09 RX ORDER — LABETALOL HYDROCHLORIDE 5 MG/ML
20 INJECTION, SOLUTION INTRAVENOUS ONCE AS NEEDED
Status: DISCONTINUED | OUTPATIENT
Start: 2023-12-09 | End: 2023-12-10 | Stop reason: HOSPADM

## 2023-12-09 RX ORDER — ONDANSETRON 4 MG/1
4 TABLET, FILM COATED ORAL EVERY 6 HOURS PRN
Status: DISCONTINUED | OUTPATIENT
Start: 2023-12-09 | End: 2023-12-10 | Stop reason: HOSPADM

## 2023-12-09 RX ORDER — SIMETHICONE 80 MG
80 TABLET,CHEWABLE ORAL 4 TIMES DAILY PRN
Status: DISCONTINUED | OUTPATIENT
Start: 2023-12-09 | End: 2023-12-10 | Stop reason: HOSPADM

## 2023-12-09 RX ORDER — LIDOCAINE 560 MG/1
1 PATCH PERCUTANEOUS; TOPICAL; TRANSDERMAL
Status: DISCONTINUED | OUTPATIENT
Start: 2023-12-09 | End: 2023-12-10 | Stop reason: HOSPADM

## 2023-12-09 RX ORDER — DIPHENHYDRAMINE HCL 25 MG
25 CAPSULE ORAL EVERY 6 HOURS PRN
Status: DISCONTINUED | OUTPATIENT
Start: 2023-12-09 | End: 2023-12-10 | Stop reason: HOSPADM

## 2023-12-09 RX ORDER — OXYTOCIN 10 [USP'U]/ML
10 INJECTION, SOLUTION INTRAMUSCULAR; INTRAVENOUS ONCE AS NEEDED
Status: DISCONTINUED | OUTPATIENT
Start: 2023-12-09 | End: 2023-12-10 | Stop reason: HOSPADM

## 2023-12-09 RX ORDER — BISACODYL 10 MG/1
10 SUPPOSITORY RECTAL DAILY PRN
Status: DISCONTINUED | OUTPATIENT
Start: 2023-12-09 | End: 2023-12-10 | Stop reason: HOSPADM

## 2023-12-09 RX ORDER — OXYCODONE AND ACETAMINOPHEN 5; 325 MG/1; MG/1
1 TABLET ORAL EVERY 12 HOURS
Status: DISCONTINUED | OUTPATIENT
Start: 2023-12-09 | End: 2023-12-10 | Stop reason: HOSPADM

## 2023-12-09 RX ORDER — LOPERAMIDE HYDROCHLORIDE 2 MG/1
4 CAPSULE ORAL EVERY 2 HOUR PRN
Status: DISCONTINUED | OUTPATIENT
Start: 2023-12-09 | End: 2023-12-10 | Stop reason: HOSPADM

## 2023-12-09 RX ORDER — POLYETHYLENE GLYCOL 3350 17 G/17G
17 POWDER, FOR SOLUTION ORAL 2 TIMES DAILY PRN
Status: DISCONTINUED | OUTPATIENT
Start: 2023-12-09 | End: 2023-12-10 | Stop reason: HOSPADM

## 2023-12-09 RX ORDER — ONDANSETRON HYDROCHLORIDE 2 MG/ML
4 INJECTION, SOLUTION INTRAVENOUS EVERY 6 HOURS PRN
Status: DISCONTINUED | OUTPATIENT
Start: 2023-12-09 | End: 2023-12-10 | Stop reason: HOSPADM

## 2023-12-09 RX ORDER — METHYLERGONOVINE MALEATE 0.2 MG/ML
0.2 INJECTION INTRAVENOUS ONCE AS NEEDED
Status: DISCONTINUED | OUTPATIENT
Start: 2023-12-09 | End: 2023-12-10 | Stop reason: HOSPADM

## 2023-12-09 RX ORDER — ALBUTEROL SULFATE 90 UG/1
2 AEROSOL, METERED RESPIRATORY (INHALATION) EVERY 4 HOURS PRN
Status: DISCONTINUED | OUTPATIENT
Start: 2023-12-09 | End: 2023-12-10 | Stop reason: HOSPADM

## 2023-12-09 RX ADMIN — ACETAMINOPHEN 650 MG: 325 TABLET ORAL at 13:48

## 2023-12-09 RX ADMIN — IBUPROFEN 600 MG: 600 TABLET, FILM COATED ORAL at 07:39

## 2023-12-09 RX ADMIN — OXYCODONE HYDROCHLORIDE AND ACETAMINOPHEN 1 TABLET: 5; 325 TABLET ORAL at 21:39

## 2023-12-09 RX ADMIN — ACETAMINOPHEN 975 MG: 325 TABLET ORAL at 07:41

## 2023-12-09 RX ADMIN — IBUPROFEN 600 MG: 600 TABLET ORAL at 20:04

## 2023-12-09 RX ADMIN — IBUPROFEN 600 MG: 600 TABLET ORAL at 13:49

## 2023-12-09 RX ADMIN — ACETAMINOPHEN 650 MG: 325 TABLET ORAL at 20:03

## 2023-12-09 ASSESSMENT — PAIN SCALES - GENERAL
PAINLEVEL_OUTOF10: 6
PAINLEVEL_OUTOF10: 0 - NO PAIN
PAINLEVEL_OUTOF10: 0 - NO PAIN
PAINLEVEL_OUTOF10: 5 - MODERATE PAIN
PAINLEVEL_OUTOF10: 6
PAINLEVEL_OUTOF10: 5 - MODERATE PAIN

## 2023-12-09 ASSESSMENT — PAIN - FUNCTIONAL ASSESSMENT
PAIN_FUNCTIONAL_ASSESSMENT: 0-10

## 2023-12-09 ASSESSMENT — PAIN DESCRIPTION - DESCRIPTORS
DESCRIPTORS: ACHING;SORE
DESCRIPTORS: SORE

## 2023-12-09 ASSESSMENT — PAIN DESCRIPTION - LOCATION: LOCATION: BACK

## 2023-12-09 NOTE — CARE PLAN
Problem: Postpartum  Goal: Experiences normal postpartum course  Outcome: Progressing  Goal: Appropriate maternal -  bonding  Outcome: Progressing  Goal: Establish and maintain infant feeding pattern for adequate nutrition  Outcome: Progressing  Goal: Incisions, wounds, or drain sites healing without S/S of infection  Outcome: Progressing  Goal: No s/sx infection  Outcome: Progressing  Goal: No s/sx of hemorrhage  Outcome: Progressing  Goal: Minimal s/sx of HDP and BP<160/110  Outcome: Progressing     Problem: Hypertensive Disorder of Pregnancy (HDP)  Goal: Minimal s/sx of HDP and BP<160/110  Outcome: Progressing  Goal: Adequate urine output (0.5 ml/kg/hr)  Outcome: Progressing

## 2023-12-09 NOTE — DISCHARGE SUMMARY
Discharge Diagnosis  Normal labor and delivery    Issues Requiring Follow-Up  Pp  visit   4  weeks      Discharge Meds     Your medication list        ASK your doctor about these medications        Instructions Last Dose Given Next Dose Due   albuterol 90 mcg/actuation inhaler  Commonly known as: Ventolin HFA      Inhale 2 puffs every 4 hours if needed for wheezing or shortness of breath.       amphetamine-dextroamphetamine 20 mg tablet  Commonly known as: Adderall      Take 1 tablet (20 mg) by mouth once daily.       FeroSuL tablet  Generic drug: ferrous sulfate (325 mg ferrous sulfate)           naloxone 4 mg/0.1 mL nasal spray  Commonly known as: Narcan           oxyCODONE-acetaminophen 5-325 mg tablet  Commonly known as: Percocet      Take 1 tablet by mouth every 12 hours. Do not start before 2023.       Prenatal 27 mg iron-800 mcg folic acid tablet  Generic drug: prenatal vitamin (iron-folic)           Vitamin B-6 25 mg tablet  Generic drug: pyridoxine                    Test Results Pending At Discharge  Pending Labs       Order Current Status    CBC Collected (23)    Comprehensive metabolic panel Collected (23)    Syphillis Screen, with reflex VDRL Collected (23)    Type And Screen Collected (23)            Hospital Course     36  1/2 weeks    arrived   7 cm   then     live  female  ,  home    on   ppd   1  if   baby   can  go   home   , no   repair   9/9  apgars    ,   giver steroid    but   no   time   for   vanco   for   gbs      Pertinent Physical Exam At Time of Discharge  Physical Exam    Outpatient Follow-Up  Future Appointments   Date Time Provider Department Center   2023  1:50 PM Cristiane Allison MD [id number]   2024 11:00 AM Ember Morales MD [id number]         Po Pina MD

## 2023-12-09 NOTE — H&P
History Of Present Illness  Nga Phoenix is a 28 y.o. female presenting with q  2-3  min   ctxs    36 1/2 weeks   prenatal  neg   aller  clinda  pcn   will  give  vando  if   time, for  gbs, meds   oxycodone 5 mg q day adderall bid 20 mg pmh  fibromyalgia  adhd psh   knee d and c  smoke 1/2 ppd  etoh  neg  drugs   neg   gbs  pending  .     Past Medical History  She has a past medical history of ADHD (attention deficit hyperactivity disorder), Allergic, Anemia, Anxiety, Bursitis of unspecified shoulder, Depression, Encounter for supervision of normal pregnancy, unspecified, third trimester (2016), Headache, Hoarseness (2023), Kidney stone on left side (2023), Personal history of other infectious and parasitic diseases, Personal history of other specified conditions (2022), Personal history of urinary (tract) infections (09/15/2022), and Visual impairment.    Surgical History  She has a past surgical history that includes Knee surgery (2016); Dilation and curettage of uterus (2016); Other surgical history (2022); and [place] tooth extraction.     Social History  She reports that she has been smoking cigarettes. She has a 5.00 pack-year smoking history. She has never used smokeless tobacco. She reports that she does not drink alcohol and does not use drugs.    Family History  Family History   Problem Relation Name Age of Onset    Anxiety disorder Mother Mom     Asthma Mother Mom     Hypertension Mother Mom     Other (thyroid problem) Mother Mom     Anxiety disorder Sister      Anxiety disorder Maternal Grandmother Grandma Savannah     Clotting disorder Maternal Grandmother Grandma Savannah     Other (cardiac disorder) Maternal Grandmother Grandma Savannah     Stroke Maternal Grandmother Grandma Savannah     Diabetes Maternal Grandmother Grandma Savannah     Hypertension Maternal Grandmother Grandma Savannah     Heart attack Maternal Grandmother Grandma Savannah     Hyperlipidemia Maternal Grandmother Grandma Savannah      Dementia Maternal Grandmother Grandma Savannah     Cancer Maternal Grandfather Pappy     Graves' disease Paternal Grandfather      Stroke Other Maternal Uncle     Prostate cancer Other Maternal Uncle     Throat cancer Other Paternal Uncle     Breast cancer Paternal Grandmother Grandma duncan         Allergies  Amoxicillin, Clindamycin, Codeine, Duloxetine, Latex, Meperidine (pf), Nitrofurantoin monohyd/m-cryst, Nsaids (non-steroidal anti-inflammatory drug), Penicillins, Propoxyphene n-acetaminophen, Iodinated contrast media, Tramadol, and Tramadol hcl    Review of Systems     Physical Exam     Last Recorded Vitals  BP (!) 142/98   Pulse 60   Resp 20   SpO2 99%     Relevant Results        Heart   rrr lungs   cta  abd  soft   n/t   cx  7/90/-1/vtx  fhts  cat  1       Assessment/Plan   Principal Problem:    Normal labor and delivery        active  phase   plan   admit  labs  ant            Po Pina MD

## 2023-12-09 NOTE — CARE PLAN
Problem: Postpartum  Goal: Experiences normal postpartum course  2023 by Viviana Brown RN  Outcome: Progressing  2023 by Viviana Brown RN  Outcome: Progressing  Goal: Appropriate maternal -  bonding  2023 by Viviana Brown RN  Outcome: Progressing  2023 by Viviana Brown RN  Outcome: Progressing  Goal: Establish and maintain infant feeding pattern for adequate nutrition  2023 by Viviana Brown RN  Outcome: Progressing  2023 by Viviana Brown RN  Outcome: Progressing  Goal: Incisions, wounds, or drain sites healing without S/S of infection  2023 by Viviana Brown RN  Outcome: Progressing  2023 by Vivinaa Brown RN  Outcome: Progressing  Goal: No s/sx infection  2023 by Viviana Brown RN  Outcome: Progressing  2023 by Viviana Brown RN  Outcome: Progressing  Goal: No s/sx of hemorrhage  2023 by Viviana Brown RN  Outcome: Progressing  2023 by Viviana Brown RN  Outcome: Progressing  Goal: Minimal s/sx of HDP and BP<160/110  2023 by Viviana Brown RN  Outcome: Progressing  2023 by Viviana Brown RN  Outcome: Progressing     Problem: Hypertensive Disorder of Pregnancy (HDP)  Goal: Minimal s/sx of HDP and BP<160/110  2023 by Viviana Brown RN  Outcome: Progressing  2023 by Viviana Brown RN  Outcome: Progressing  Goal: Adequate urine output (0.5 ml/kg/hr)  2023 by Viviana Brown RN  Outcome: Progressing  2023 by Viviana Brown RN  Outcome: Progressing

## 2023-12-09 NOTE — POST-PROCEDURE NOTE
PRE-PROCEDURE DIAGNOSIS: Pregnancy at 36 1/2 weeks   gestational age, active labor  POST PROCEDURE DIAGNOSIS: same  PROCEDURE:   SURGEON: juan manuel  ASSISTANT: None  ANTIBIOTICS: vanco  but  no   time  to  get   it  FINDINGS: live   female   INFANT: Live viable female, APGARS 9/9,   [id number]  COMPLICATIONS: none    The patient presented to Labor and Delivery for q2-3  min  ctxs.  the patient progressed to complete cervical dilation. The patient pushed for .2 hours and delivered by .  Delayed cord clamping  done after 30 seconds, during which time the infant's nose and mouth were suctioned. The cord was clamped and cut. The placenta delivered expressed  The fundus was expressed and found to be firm . Postpartum bleeding was found to be appropriate.

## 2023-12-09 NOTE — NURSING NOTE
"2245 Pt arrived to unit via EMS transportation team. Pt move to LDR bed per self. Pt states she \"needs pain relief now, wants epidural. Pt \"unable to get into bed. Feels pressue.\" Pt oriented to room and pain management discussed, pt encouraged to allow staff to get fetal heart tones and assessment. Pt into bed, not coping well with labor. Family en route. Prenatal care with [facility] team.   2247 EFM placed, . No vaginal bleeding. Contx 2 min (60sec-90s). GBS pending, prenatals obtained and given to Dr Pina at desk.  "

## 2023-12-09 NOTE — PROGRESS NOTES
No  c/o  , min   bleeding  / pain Pos  flatus ,   heart   rrr  lungs  cta   abd   soft   n/t  extr    neg   Ppd  1    doing   well  plan  routine   care  ,  gbs   pending  from  office   , she   did  not   get   abxs   in   time before   nathaniel Pina MD

## 2023-12-09 NOTE — H&P
History Of Present Illness  Nga Phoenix is a 28 y.o. female presenting with  36 weeks   with   q  2-3  min   ctxs  prenatal   neg  aller    clinda    pcn  meds oxycodone  adderall pmh    fibromyalgia  adhd  psh  knee    d and  c   smoke   1/2  ppd  etoh  neg   drugs   neg   .     Past Medical History  She has a past medical history of ADHD (attention deficit hyperactivity disorder), Allergic, Anemia, Anxiety, Bursitis of unspecified shoulder, Depression, Encounter for supervision of normal pregnancy, unspecified, third trimester (2016), Headache, Hoarseness (2023), Kidney stone on left side (2023), Personal history of other infectious and parasitic diseases, Personal history of other specified conditions (2022), Personal history of urinary (tract) infections (09/15/2022), and Visual impairment.    Surgical History  She has a past surgical history that includes Knee surgery (2016); Dilation and curettage of uterus (2016); Other surgical history (2022); and Veyo tooth extraction.     Social History  She reports that she has been smoking cigarettes. She has a 5.00 pack-year smoking history. She has never used smokeless tobacco. She reports that she does not drink alcohol and does not use drugs.    Family History  Family History   Problem Relation Name Age of Onset    Anxiety disorder Mother Mom     Asthma Mother Mom     Hypertension Mother Mom     Other (thyroid problem) Mother Mom     Anxiety disorder Sister      Anxiety disorder Maternal Grandmother Grandma Savannah     Clotting disorder Maternal Grandmother Grandma Savannah     Other (cardiac disorder) Maternal Grandmother Grandma Savannah     Stroke Maternal Grandmother Grandma Savannah     Diabetes Maternal Grandmother Grandma Savannah     Hypertension Maternal Grandmother Grandma Saavnnah     Heart attack Maternal Grandmother Grandma Savannah     Hyperlipidemia Maternal Grandmother Grandma Savannah     Dementia Maternal Grandmother Grandma Savannah     Cancer  Maternal Grandfather Pappy     Graves' disease Paternal Grandfather      Stroke Other Maternal Uncle     Prostate cancer Other Maternal Uncle     Throat cancer Other Paternal Uncle     Breast cancer Paternal Grandmother Grandma duncan         Allergies  Amoxicillin, Clindamycin, Codeine, Duloxetine, Latex, Meperidine (pf), Nitrofurantoin monohyd/m-cryst, Nsaids (non-steroidal anti-inflammatory drug), Penicillins, Propoxyphene n-acetaminophen, Iodinated contrast media, Tramadol, and Tramadol hcl    Review of Systemsneg     Physical Examheart   rrr  lungs   cta   abd   soft  n/t  cx   7/-1/vtx   fhts cat  1       Last Recorded Vitals  /81   Pulse 72   Resp 20   SpO2 99%     Relevant Results             Assessment/Plan   Principal Problem:    Normal labor and delivery       36 1/2 weeks    active    phase   ,  anticipate     ,  steroids  ,  vanco   for   gbs          Po Pina MD

## 2023-12-10 VITALS
RESPIRATION RATE: 16 BRPM | DIASTOLIC BLOOD PRESSURE: 71 MMHG | TEMPERATURE: 98.2 F | HEART RATE: 56 BPM | OXYGEN SATURATION: 98 % | SYSTOLIC BLOOD PRESSURE: 127 MMHG

## 2023-12-10 PROBLEM — O21.9 NAUSEA/VOMITING IN PREGNANCY (HHS-HCC): Status: RESOLVED | Noted: 2023-10-04 | Resolved: 2023-12-10

## 2023-12-10 LAB — GP B STREP GENITAL QL CULT: NORMAL

## 2023-12-10 PROCEDURE — 2500000001 HC RX 250 WO HCPCS SELF ADMINISTERED DRUGS (ALT 637 FOR MEDICARE OP): Performed by: OBSTETRICS & GYNECOLOGY

## 2023-12-10 RX ADMIN — OXYCODONE HYDROCHLORIDE AND ACETAMINOPHEN 1 TABLET: 5; 325 TABLET ORAL at 12:20

## 2023-12-10 RX ADMIN — DEXTROAMPHETAMINE SACCHARATE, AMPHETAMINE ASPARTATE, DEXTROAMPHETAMINE SULFATE AND AMPHETAMINE SULFATE 20 MG: 2.5; 2.5; 2.5; 2.5 TABLET ORAL at 09:59

## 2023-12-10 RX ADMIN — FERROUS SULFATE TAB 325 MG (65 MG ELEMENTAL FE) 1 TABLET: 325 (65 FE) TAB at 09:05

## 2023-12-10 RX ADMIN — Medication 1 TABLET: at 09:05

## 2023-12-10 RX ADMIN — IBUPROFEN 600 MG: 600 TABLET ORAL at 09:05

## 2023-12-10 ASSESSMENT — PAIN SCALES - GENERAL
PAINLEVEL_OUTOF10: 6
PAINLEVEL_OUTOF10: 5 - MODERATE PAIN

## 2023-12-10 NOTE — PROGRESS NOTES
No  c/o  , min   bleeding  / pain Pos  flatus ,   heart   rrr  lungs  cta   abd   soft   n/t    extr    neg   Ppd  2    doing   well  plan   home    ,  no   sex  x 6 weeks   rv   4  weeks  ,  bps   nl   initial   bps   were   mild   range   then   all   nl   so  check   home   bps   and   rv   office this  week  for   bp  check  ,  call if    HA vis   ch  or   epigastric  pain     or   increased  bps      Po Pina MD

## 2023-12-10 NOTE — NURSING NOTE
0130 Pt found to be sleeping with  in bed with her. Safe sleep reinforced. Infant placed in bassinet. Pt desires to sleep and not be be waken for meds. She will call when she wakes. Baby to nursery with nurse for testing. Pt call light within reach. Family present at bedside

## 2023-12-10 NOTE — CARE PLAN
The patient's goals for the shift include pain control    The clinical goals for the shift include stable VS    Pt slept after pain medications. VSS. Desires discharge home today.

## 2023-12-11 DIAGNOSIS — M54.2 CERVICAL PAIN: ICD-10-CM

## 2023-12-11 DIAGNOSIS — M54.9 CHRONIC BACK PAIN, UNSPECIFIED BACK LOCATION, UNSPECIFIED BACK PAIN LATERALITY: ICD-10-CM

## 2023-12-11 DIAGNOSIS — M54.6 CHRONIC THORACIC SPINE PAIN: ICD-10-CM

## 2023-12-11 DIAGNOSIS — G89.29 CHRONIC THORACIC SPINE PAIN: ICD-10-CM

## 2023-12-11 DIAGNOSIS — M79.7 FIBROMYALGIA: ICD-10-CM

## 2023-12-11 DIAGNOSIS — G89.29 CHRONIC BACK PAIN, UNSPECIFIED BACK LOCATION, UNSPECIFIED BACK PAIN LATERALITY: ICD-10-CM

## 2023-12-11 RX ORDER — OXYCODONE AND ACETAMINOPHEN 5; 325 MG/1; MG/1
1 TABLET ORAL EVERY 12 HOURS
Qty: 60 TABLET | Refills: 0 | Status: SHIPPED | OUTPATIENT
Start: 2023-12-11 | End: 2024-01-11 | Stop reason: SDUPTHER

## 2023-12-11 NOTE — TELEPHONE ENCOUNTER
----- Message from Nga Phoenix sent at 12/8/2023  4:12 PM EST -----  Regarding: Refill request  Contact: [phone]  Hello, I am sending a message to request a refill to be sent to [facility], located in [place], [place], for my prescription of Oxycodone/Acetaminophen 5/325 tablets. There is no rush on this request, you can call it in whenever you are able to next week. Thank you!!

## 2023-12-11 NOTE — PROGRESS NOTES
"Subjective   Patient ID [id number]   Nga Phoenix is a 28 y.o.  at 36w4d with a working estimated date of delivery of 2024, by Ultrasound who presents for a routine prenatal visit. She denies vaginal bleeding, leakage of fluid, decreased fetal movements, or contractions.    Her pregnancy is complicated by:  ***    Objective   Physical Exam:      Expected Total Weight Gain: 11.5 kg (25 lb)-16 kg (35 lb)   Pregravid BMI: 19.84                     Prenatal Labs  Urine Dip:  Lab Results   Component Value Date    KETONESU NEGATIVE 10/27/2023     Lab Results   Component Value Date    HGB 10.7 (L) 2023    HCT 32.0 (L) 2023    ABO A 2023    HEPBSAG NONREACTIVE 2023     No results found for: \"PAPPA\", \"AFP\", \"HCG\", \"ESTRIOL\", \"INHBA\"  No results found for: \"GLUF\", \"GLUT1\", \"AGGLMGN8OP\", \"RUOVBPH8LW\"    Imaging  The most recent ultrasound was performed on   with a study GA of   and EFW of  .          Assessment/Plan   {Assess/Plan SmartLinks (Optional):40465}  Continue prenatal vitamin.  Labs reviewed.  GBS taken.  Expected mode of delivery ***  Follow up in 1 week for a routine prenatal visit.  "

## 2023-12-13 ENCOUNTER — TELEPHONE (OUTPATIENT)
Dept: OBSTETRICS AND GYNECOLOGY | Facility: HOSPITAL | Age: 28
End: 2023-12-13
Payer: COMMERCIAL

## 2023-12-13 NOTE — PROGRESS NOTES
Follow-Up Note    Care Type: Women's Health  Phone Number Called: [phone]    Call Outcome (connected, left message, no answer, phone disconnected): connected  Patient reports feeling symptoms are (better, worse, same): better    After returning home from the hospital, was it clear to you:     Which Meds were new Meds? No new meds  Which Meds to continue? yes  Which Meds to stop? N/A  Who participated in medication reconciliation with the hospital staff (patient, family, other, no one): patient    To be answered by care coordinator or staff member doing call back:     In your professional opinion, do you think there was a medication discrepancy or potential for medication discrepancy in this situation? no    Medication issues (none, confusion which medications to take, non-adherence to medication, prescription unfilled-inadequate funds, prescription unfilled-pharmacy will not fill (prescription unfilled-unable to get to pharmacy, troubled by side effects, other-see comments): none    Discharge Instructions were clear: yes    Patient has a primary care provider: yes  Post-hospital follow-up occurred according to schedule: not yet  Reason (appointment scheduled in future, appointment was never scheduled-encouraged patient to call, no appointment available within discharge plan, patient missed appointment): appt scheduled tomorrow    Delivered baby(ies): yes    Chest Pain?: no  SOB or difficulty breathing?: no  Seizures?: no    Any thoughts of hurting yourself or your baby?: no  Bleeding that is soaking through one pad/hour or blood clots the size of an egg or larger?: no  Incision that is not healing? no    Red or swollen leg that is painful or warm to the touch? no  Temperature of 100.4*F or higher?: no  Headache that does not get better, even after taking medicine, or a bad headache with vision changes?: no    Where or in what is your baby sleeping? bassinet in patient's room     ABC's of sleep covered?  yes    How are you feeding your baby(ies)-breast, EBM, formula, supplementation?: formula  Baby getting anything other than breastmilk or formula for food? no    Patient has Primary Care Provider for baby(ies)? yes  Baby has been seen by a health care provider since discharge? yes  If no, reason (appointment scheduled in the future, appointment was never scheduled, no appointment available within discharge plan, patient missed appointment):     Mom's Discharge Date: 12/10/2023  Date Baby was seen by provider: 12/12/2023    Patient has specific name and number of who to call for concerns?: yes    Date/Time of Call: 12/13/2023 @ 2757  Call back done by (care coordinator, relationship based nurse, patient returned call, , lactation consultant, manager/supervisor, patient navigator, social work, other-see comments): relationship based nurse      Comments:               Attempt Date 1: 12/13/2023  Call Attempt 1 outcome: Connected with patient. Follow-Up call successful.      Attempt Date 2:   Call Attempt 2 outcome:

## 2023-12-14 ENCOUNTER — POSTPARTUM VISIT (OUTPATIENT)
Dept: OBSTETRICS AND GYNECOLOGY | Facility: CLINIC | Age: 28
End: 2023-12-14
Payer: COMMERCIAL

## 2023-12-14 VITALS — DIASTOLIC BLOOD PRESSURE: 90 MMHG | WEIGHT: 120 LBS | SYSTOLIC BLOOD PRESSURE: 138 MMHG | BODY MASS INDEX: 21.26 KG/M2

## 2023-12-14 DIAGNOSIS — R30.0 DYSURIA: ICD-10-CM

## 2023-12-14 PROCEDURE — 87086 URINE CULTURE/COLONY COUNT: CPT

## 2023-12-14 PROCEDURE — 99212 OFFICE O/P EST SF 10 MIN: CPT | Performed by: OBSTETRICS & GYNECOLOGY

## 2023-12-14 RX ORDER — NIFEDIPINE 30 MG/1
30 TABLET, FILM COATED, EXTENDED RELEASE ORAL
Qty: 30 TABLET | Refills: 11 | Status: SHIPPED | OUTPATIENT
Start: 2023-12-14 | End: 2024-05-06 | Stop reason: WASHOUT

## 2023-12-14 NOTE — PROGRESS NOTES
28 y.o.  presenting for 5 day postpartum follow-up   H/o intrapartum and postpartum hypertension  C/o headaches, daily,denies any other acute symptoms   States that home BP has been 140s to 170s/[id number]  Delivery Date: 2023  GA at Delivery: 36 weeks  Type of Delivery:     Pregnancy notable for:    Concerns: elevated BP, dysuria    Pain: controlled  Lacerations: none  Lochia: none  Menses: No  Sexual Intimacy: No  Contraceptive Method: None  Feeding Method: breast and bottle  Lactation Consult Needed?: No    Birth Trauma: No  Bonding with Baby: well with Female [unfilled]   Mood:       OBGyn Exam     Problem List Items Addressed This Visit    None  Visit Diagnoses       Postpartum hypertension        Relevant Medications    NIFEdipine ER (Adalat CC) 30 mg 24 hr tablet    Other Relevant Orders    CBC    Comprehensive metabolic panel    Lactate Dehydrogenase    Protein, Urine Random            IMPRESSIONS:    Orders Placed This Encounter   Procedures    CBC     Standing Status:   Future     Standing Expiration Date:   2024     Order Specific Question:   Release result to MyChart     Answer:   Immediate [1]    Comprehensive metabolic panel     Standing Status:   Future     Standing Expiration Date:   2024     Order Specific Question:   Release result to MyChart     Answer:   Immediate [1]    Lactate Dehydrogenase     Standing Status:   Future     Standing Expiration Date:   2024     Order Specific Question:   Release result to MyChart     Answer:   Immediate [1]    Protein, Urine Random     Order Specific Question:   Release result to MyChart     Answer:   Immediate [1]       We look forward to seeing you again at your next scheduled visit in 3 weeks  Will start on nifedepine 30 mg daily    Cristiane Allison MD

## 2023-12-16 LAB — BACTERIA UR CULT: NORMAL

## 2024-01-11 DIAGNOSIS — M54.2 CERVICAL PAIN: ICD-10-CM

## 2024-01-11 DIAGNOSIS — M54.6 CHRONIC THORACIC SPINE PAIN: ICD-10-CM

## 2024-01-11 DIAGNOSIS — M54.9 CHRONIC BACK PAIN, UNSPECIFIED BACK LOCATION, UNSPECIFIED BACK PAIN LATERALITY: ICD-10-CM

## 2024-01-11 DIAGNOSIS — G89.29 CHRONIC THORACIC SPINE PAIN: ICD-10-CM

## 2024-01-11 DIAGNOSIS — G89.29 CHRONIC BACK PAIN, UNSPECIFIED BACK LOCATION, UNSPECIFIED BACK PAIN LATERALITY: ICD-10-CM

## 2024-01-11 DIAGNOSIS — M79.7 FIBROMYALGIA: ICD-10-CM

## 2024-01-11 RX ORDER — OXYCODONE AND ACETAMINOPHEN 5; 325 MG/1; MG/1
1 TABLET ORAL EVERY 12 HOURS
Qty: 60 TABLET | Refills: 0 | Status: SHIPPED | OUTPATIENT
Start: 2024-01-11 | End: 2024-02-06 | Stop reason: SDUPTHER

## 2024-01-12 NOTE — TELEPHONE ENCOUNTER
Last appointment: 11/7/2023  Next appointment: 2/14/2024     Eprescribed. I have personally reviewed the OARRS report.  This report is scanned into the electronic medical record.  I have considered the risks of abuse, dependence, addiction and diversion.  I believe that it is clinically appropriate to prescribe this medication.

## 2024-02-06 DIAGNOSIS — M79.7 FIBROMYALGIA: ICD-10-CM

## 2024-02-06 DIAGNOSIS — G89.29 CHRONIC THORACIC SPINE PAIN: ICD-10-CM

## 2024-02-06 DIAGNOSIS — M54.9 CHRONIC BACK PAIN, UNSPECIFIED BACK LOCATION, UNSPECIFIED BACK PAIN LATERALITY: ICD-10-CM

## 2024-02-06 DIAGNOSIS — G89.29 CHRONIC BACK PAIN, UNSPECIFIED BACK LOCATION, UNSPECIFIED BACK PAIN LATERALITY: ICD-10-CM

## 2024-02-06 DIAGNOSIS — M54.6 CHRONIC THORACIC SPINE PAIN: ICD-10-CM

## 2024-02-06 DIAGNOSIS — M54.2 CERVICAL PAIN: ICD-10-CM

## 2024-02-07 NOTE — TELEPHONE ENCOUNTER
----- Message from Velma Galvez CMA sent at 2/7/2024  8:39 AM EST -----  Regarding: FW: Refill prescription  Contact: [phone]    ----- Message -----  From: Nga Phoenix  Sent: 2/7/2024  12:23 AM EST  To: [id number] Clinical Support Staff  Subject: Refill prescription                              The prescription link was not working through my aleena, so I'm requesting my medication refill through the regular messaging link...  I am sending a message to request a refill to be sent to [facility], located in [place], [place], for my prescription of Oxycodone/Acetaminophen 5/325 tablets. Thank you!

## 2024-02-08 RX ORDER — OXYCODONE AND ACETAMINOPHEN 5; 325 MG/1; MG/1
1 TABLET ORAL EVERY 12 HOURS
Qty: 60 TABLET | Refills: 0 | Status: SHIPPED | OUTPATIENT
Start: 2024-02-08 | End: 2024-02-28 | Stop reason: SDUPTHER

## 2024-02-14 ENCOUNTER — APPOINTMENT (OUTPATIENT)
Dept: PRIMARY CARE | Facility: CLINIC | Age: 29
End: 2024-02-14
Payer: COMMERCIAL

## 2024-02-28 DIAGNOSIS — M54.9 CHRONIC BACK PAIN, UNSPECIFIED BACK LOCATION, UNSPECIFIED BACK PAIN LATERALITY: ICD-10-CM

## 2024-02-28 DIAGNOSIS — G89.29 CHRONIC THORACIC SPINE PAIN: ICD-10-CM

## 2024-02-28 DIAGNOSIS — M79.7 FIBROMYALGIA: ICD-10-CM

## 2024-02-28 DIAGNOSIS — G89.29 CHRONIC BACK PAIN, UNSPECIFIED BACK LOCATION, UNSPECIFIED BACK PAIN LATERALITY: ICD-10-CM

## 2024-02-28 DIAGNOSIS — M54.6 CHRONIC THORACIC SPINE PAIN: ICD-10-CM

## 2024-02-28 DIAGNOSIS — M54.2 CERVICAL PAIN: ICD-10-CM

## 2024-02-28 RX ORDER — OXYCODONE AND ACETAMINOPHEN 5; 325 MG/1; MG/1
1 TABLET ORAL EVERY 12 HOURS
Qty: 60 TABLET | Refills: 0 | Status: SHIPPED | OUTPATIENT
Start: 2024-02-28 | End: 2024-04-01 | Stop reason: SDUPTHER

## 2024-02-28 NOTE — TELEPHONE ENCOUNTER
Last appointment: 11/7/2023  Next appointment: 3/21/2024     Eprescribed. I have personally reviewed the OARRS report.  This report is scanned into the electronic medical record.  I have considered the risks of abuse, dependence, addiction and diversion.  I believe that it is clinically appropriate to prescribe this medication.

## 2024-03-21 ENCOUNTER — OFFICE VISIT (OUTPATIENT)
Dept: PRIMARY CARE | Facility: CLINIC | Age: 29
End: 2024-03-21
Payer: COMMERCIAL

## 2024-04-01 ENCOUNTER — TELEPHONE (OUTPATIENT)
Dept: PRIMARY CARE | Facility: CLINIC | Age: 29
End: 2024-04-01
Payer: COMMERCIAL

## 2024-04-01 DIAGNOSIS — G89.29 CHRONIC THORACIC SPINE PAIN: ICD-10-CM

## 2024-04-01 DIAGNOSIS — M54.6 CHRONIC THORACIC SPINE PAIN: ICD-10-CM

## 2024-04-01 DIAGNOSIS — M54.9 CHRONIC BACK PAIN, UNSPECIFIED BACK LOCATION, UNSPECIFIED BACK PAIN LATERALITY: ICD-10-CM

## 2024-04-01 DIAGNOSIS — M54.2 CERVICAL PAIN: ICD-10-CM

## 2024-04-01 DIAGNOSIS — M79.7 FIBROMYALGIA: ICD-10-CM

## 2024-04-01 DIAGNOSIS — G89.29 CHRONIC BACK PAIN, UNSPECIFIED BACK LOCATION, UNSPECIFIED BACK PAIN LATERALITY: ICD-10-CM

## 2024-04-01 RX ORDER — OXYCODONE AND ACETAMINOPHEN 5; 325 MG/1; MG/1
1 TABLET ORAL EVERY 12 HOURS
Qty: 60 TABLET | Refills: 0 | Status: SHIPPED | OUTPATIENT
Start: 2024-04-01 | End: 2024-05-06 | Stop reason: SDUPTHER

## 2024-04-01 NOTE — TELEPHONE ENCOUNTER
Last appointment: 11/7/2023  Next appointment: Visit date not found     Eprescribed. I have personally reviewed the OARRS report.  This report is scanned into the electronic medical record.  I have considered the risks of abuse, dependence, addiction and diversion.  I believe that it is clinically appropriate to prescribe this medication.

## 2024-05-05 ENCOUNTER — APPOINTMENT (OUTPATIENT)
Dept: RADIOLOGY | Facility: HOSPITAL | Age: 29
End: 2024-05-05
Payer: COMMERCIAL

## 2024-05-05 ENCOUNTER — HOSPITAL ENCOUNTER (EMERGENCY)
Facility: HOSPITAL | Age: 29
Discharge: HOME | End: 2024-05-05
Payer: COMMERCIAL

## 2024-05-05 VITALS
OXYGEN SATURATION: 100 % | RESPIRATION RATE: 14 BRPM | HEART RATE: 110 BPM | HEIGHT: 61 IN | SYSTOLIC BLOOD PRESSURE: 93 MMHG | WEIGHT: 117 LBS | DIASTOLIC BLOOD PRESSURE: 61 MMHG | BODY MASS INDEX: 22.09 KG/M2 | TEMPERATURE: 99.9 F

## 2024-05-05 DIAGNOSIS — N12 PYELONEPHRITIS: Primary | ICD-10-CM

## 2024-05-05 LAB
ALBUMIN SERPL BCP-MCNC: 4 G/DL (ref 3.4–5)
ALP SERPL-CCNC: 64 U/L (ref 33–110)
ALT SERPL W P-5'-P-CCNC: 11 U/L (ref 7–45)
ANION GAP SERPL CALC-SCNC: 13 MMOL/L (ref 10–20)
APPEARANCE UR: ABNORMAL
AST SERPL W P-5'-P-CCNC: 13 U/L (ref 9–39)
BACTERIA #/AREA URNS AUTO: ABNORMAL /HPF
BASOPHILS # BLD AUTO: 0.05 X10*3/UL (ref 0–0.1)
BASOPHILS NFR BLD AUTO: 0.2 %
BILIRUB SERPL-MCNC: 0.4 MG/DL (ref 0–1.2)
BILIRUB UR STRIP.AUTO-MCNC: NEGATIVE MG/DL
BUN SERPL-MCNC: 11 MG/DL (ref 6–23)
CALCIUM SERPL-MCNC: 8.5 MG/DL (ref 8.6–10.3)
CHLORIDE SERPL-SCNC: 104 MMOL/L (ref 98–107)
CO2 SERPL-SCNC: 23 MMOL/L (ref 21–32)
COLOR UR: YELLOW
CREAT SERPL-MCNC: 0.66 MG/DL (ref 0.5–1.05)
EGFRCR SERPLBLD CKD-EPI 2021: >90 ML/MIN/1.73M*2
EOSINOPHIL # BLD AUTO: 0.01 X10*3/UL (ref 0–0.7)
EOSINOPHIL NFR BLD AUTO: 0 %
ERYTHROCYTE [DISTWIDTH] IN BLOOD BY AUTOMATED COUNT: 12 % (ref 11.5–14.5)
GLUCOSE SERPL-MCNC: 129 MG/DL (ref 74–99)
GLUCOSE UR STRIP.AUTO-MCNC: NEGATIVE MG/DL
HCG UR QL IA.RAPID: NEGATIVE
HCT VFR BLD AUTO: 37.6 % (ref 36–46)
HGB BLD-MCNC: 13 G/DL (ref 12–16)
HOLD SPECIMEN: NORMAL
IMM GRANULOCYTES # BLD AUTO: 0.09 X10*3/UL (ref 0–0.7)
IMM GRANULOCYTES NFR BLD AUTO: 0.4 % (ref 0–0.9)
KETONES UR STRIP.AUTO-MCNC: ABNORMAL MG/DL
LACTATE SERPL-SCNC: 1.6 MMOL/L (ref 0.4–2)
LEUKOCYTE ESTERASE UR QL STRIP.AUTO: ABNORMAL
LYMPHOCYTES # BLD AUTO: 1.22 X10*3/UL (ref 1.2–4.8)
LYMPHOCYTES NFR BLD AUTO: 6.1 %
MCH RBC QN AUTO: 32.3 PG (ref 26–34)
MCHC RBC AUTO-ENTMCNC: 34.6 G/DL (ref 32–36)
MCV RBC AUTO: 93 FL (ref 80–100)
MONOCYTES # BLD AUTO: 1.56 X10*3/UL (ref 0.1–1)
MONOCYTES NFR BLD AUTO: 7.7 %
MUCOUS THREADS #/AREA URNS AUTO: ABNORMAL /LPF
NEUTROPHILS # BLD AUTO: 17.2 X10*3/UL (ref 1.2–7.7)
NEUTROPHILS NFR BLD AUTO: 85.6 %
NITRITE UR QL STRIP.AUTO: POSITIVE
NRBC BLD-RTO: 0 /100 WBCS (ref 0–0)
PH UR STRIP.AUTO: 6 [PH]
PLATELET # BLD AUTO: 424 X10*3/UL (ref 150–450)
POTASSIUM SERPL-SCNC: 3.6 MMOL/L (ref 3.5–5.3)
PROT SERPL-MCNC: 6.7 G/DL (ref 6.4–8.2)
PROT UR STRIP.AUTO-MCNC: ABNORMAL MG/DL
RBC # BLD AUTO: 4.03 X10*6/UL (ref 4–5.2)
RBC # UR STRIP.AUTO: ABNORMAL /UL
RBC #/AREA URNS AUTO: ABNORMAL /HPF
SODIUM SERPL-SCNC: 136 MMOL/L (ref 136–145)
SP GR UR STRIP.AUTO: 1.01
SQUAMOUS #/AREA URNS AUTO: ABNORMAL /HPF
UROBILINOGEN UR STRIP.AUTO-MCNC: 2 MG/DL
WBC # BLD AUTO: 20.1 X10*3/UL (ref 4.4–11.3)
WBC #/AREA URNS AUTO: >50 /HPF
WBC CLUMPS #/AREA URNS AUTO: ABNORMAL /HPF

## 2024-05-05 PROCEDURE — 36415 COLL VENOUS BLD VENIPUNCTURE: CPT | Performed by: PHYSICIAN ASSISTANT

## 2024-05-05 PROCEDURE — 96375 TX/PRO/DX INJ NEW DRUG ADDON: CPT

## 2024-05-05 PROCEDURE — 85025 COMPLETE CBC W/AUTO DIFF WBC: CPT | Performed by: PHYSICIAN ASSISTANT

## 2024-05-05 PROCEDURE — 74176 CT ABD & PELVIS W/O CONTRAST: CPT | Performed by: RADIOLOGY

## 2024-05-05 PROCEDURE — 2500000004 HC RX 250 GENERAL PHARMACY W/ HCPCS (ALT 636 FOR OP/ED): Performed by: PHYSICIAN ASSISTANT

## 2024-05-05 PROCEDURE — 96361 HYDRATE IV INFUSION ADD-ON: CPT

## 2024-05-05 PROCEDURE — 81001 URINALYSIS AUTO W/SCOPE: CPT | Performed by: PHYSICIAN ASSISTANT

## 2024-05-05 PROCEDURE — 96374 THER/PROPH/DIAG INJ IV PUSH: CPT

## 2024-05-05 PROCEDURE — 99284 EMERGENCY DEPT VISIT MOD MDM: CPT | Mod: 25

## 2024-05-05 PROCEDURE — 74176 CT ABD & PELVIS W/O CONTRAST: CPT

## 2024-05-05 PROCEDURE — 81025 URINE PREGNANCY TEST: CPT | Performed by: PHYSICIAN ASSISTANT

## 2024-05-05 PROCEDURE — 83605 ASSAY OF LACTIC ACID: CPT | Performed by: PHYSICIAN ASSISTANT

## 2024-05-05 PROCEDURE — 80053 COMPREHEN METABOLIC PANEL: CPT | Performed by: PHYSICIAN ASSISTANT

## 2024-05-05 PROCEDURE — 2500000001 HC RX 250 WO HCPCS SELF ADMINISTERED DRUGS (ALT 637 FOR MEDICARE OP): Performed by: PHYSICIAN ASSISTANT

## 2024-05-05 PROCEDURE — 87086 URINE CULTURE/COLONY COUNT: CPT | Mod: PORLAB | Performed by: PHYSICIAN ASSISTANT

## 2024-05-05 RX ORDER — MORPHINE SULFATE 4 MG/ML
4 INJECTION INTRAVENOUS ONCE
Status: COMPLETED | OUTPATIENT
Start: 2024-05-05 | End: 2024-05-05

## 2024-05-05 RX ORDER — CIPROFLOXACIN 500 MG/1
500 TABLET ORAL 2 TIMES DAILY
Qty: 20 TABLET | Refills: 0 | Status: SHIPPED | OUTPATIENT
Start: 2024-05-05 | End: 2024-05-08 | Stop reason: SINTOL

## 2024-05-05 RX ORDER — CIPROFLOXACIN 500 MG/1
500 TABLET ORAL ONCE
Status: COMPLETED | OUTPATIENT
Start: 2024-05-05 | End: 2024-05-05

## 2024-05-05 RX ORDER — ONDANSETRON HYDROCHLORIDE 2 MG/ML
4 INJECTION, SOLUTION INTRAVENOUS ONCE
Status: COMPLETED | OUTPATIENT
Start: 2024-05-05 | End: 2024-05-05

## 2024-05-05 RX ORDER — ONDANSETRON 4 MG/1
4 TABLET, ORALLY DISINTEGRATING ORAL EVERY 8 HOURS PRN
Qty: 20 TABLET | Refills: 0 | Status: SHIPPED | OUTPATIENT
Start: 2024-05-05

## 2024-05-05 RX ADMIN — MORPHINE SULFATE 4 MG: 4 INJECTION, SOLUTION INTRAMUSCULAR; INTRAVENOUS at 11:39

## 2024-05-05 RX ADMIN — CIPROFLOXACIN 500 MG: 500 TABLET ORAL at 14:17

## 2024-05-05 RX ADMIN — SODIUM CHLORIDE 1000 ML: 9 INJECTION, SOLUTION INTRAVENOUS at 11:41

## 2024-05-05 RX ADMIN — ONDANSETRON 4 MG: 2 INJECTION INTRAMUSCULAR; INTRAVENOUS at 11:39

## 2024-05-05 ASSESSMENT — COLUMBIA-SUICIDE SEVERITY RATING SCALE - C-SSRS
2. HAVE YOU ACTUALLY HAD ANY THOUGHTS OF KILLING YOURSELF?: NO
1. IN THE PAST MONTH, HAVE YOU WISHED YOU WERE DEAD OR WISHED YOU COULD GO TO SLEEP AND NOT WAKE UP?: NO
6. HAVE YOU EVER DONE ANYTHING, STARTED TO DO ANYTHING, OR PREPARED TO DO ANYTHING TO END YOUR LIFE?: NO

## 2024-05-05 ASSESSMENT — PAIN DESCRIPTION - PAIN TYPE: TYPE: ACUTE PAIN

## 2024-05-05 ASSESSMENT — PAIN - FUNCTIONAL ASSESSMENT: PAIN_FUNCTIONAL_ASSESSMENT: 0-10

## 2024-05-05 ASSESSMENT — PAIN SCALES - GENERAL: PAINLEVEL_OUTOF10: 7

## 2024-05-05 NOTE — ED PROVIDER NOTES
HPI   Chief Complaint   Patient presents with    Urinary issues        History of present illness: 29-year-old female complains of abdominal and back pain for the past 2 days.  Says the pain is moderate, aching, persistent, gradually worsening over time.  Patient states that she has history of nausea although increased vomiting with about 4 episodes in the last 2 days.  Denies any bilious or hematemesis.  Denies any diarrhea, constipation, rectal bleeding, chest pain, shortness of breath.  Reports intermittent headaches.  Also has dysuria polyuria and urinary urgency.  Has history of recurrent urinary tract infections and kidney stones.  Gave birth in December 2023 and had pregnancy-induced hypertension.  She was taken off blood pressure medications after birth.  Normally she indicates her blood pressure is on the low side.  Patient has Percocet at home which she takes for chronic pain.  She does have enough until her follow-up tomorrow.  Denies any vaginal bleeding or vaginal discharge.    Review of systems: Constitutional, eye, ENT, cardiovascular, respiratory, gastrointestinal, genitourinary, neurologic, musculoskeletal, dermatologic, hematologic, endocrine systems were evaluated and were negative unless otherwise specified in history of present illness.    Medications: Reviewed and per nursing note.    Family history: Denies relevant medical conditions.    Social history: Denies tobacco, alcohol, drug use.      Physical exam:    Appearance: Well-developed, well-nourished, nontoxic-appearing, alert and oriented x3. Talking in complete sentences.    HEENT:  Head normocephalic atraumatic, extraocular movements intact, pupils equal round reactive to light, mucous membranes are moist and pink.    NECK:  Nml Inspection, no meningismus, no thyromegaly, no lymphadenopathy, no JVD, trachea is midline.    Respiratory: Clear to auscultation bilaterally with normal bilateral excursion. No wheezes, rhonchi,  crackles.    Cardiovascular: Regular rate and rhythm, no murmurs, rubs or gallops. Pulses 2+ symmetrically in the dorsalis pedis and radial pulses.    Abdomen/GI: Lower abdominal tenderness with no rebound guarding or rigidity.  nondistended, normal bowel sounds x4. No masses or organomegaly.    : Bilateral that extends into the lumbar paralumbar region CVA tenderness    Neuro:  Oriented x 3, Speech Clear, cranial nerves grossly intact. Normal sensation to light touch in all 4 extremities.    Musculoskeletal: Patient spontaneously moves all 4 extremities.    Skin:  No cyanosis, clubbing, edema, open wounds, or rashes.                          Dayne Coma Scale Score: 15                     Patient History   Past Medical History:   Diagnosis Date    ADHD (attention deficit hyperactivity disorder)     Allergic     Anemia     Anxiety     Bursitis of unspecified shoulder     Bursitis of shoulder, unspecified laterality    Depression     Encounter for supervision of normal pregnancy, unspecified, third trimester ([facility]) 05/17/2016    Encounter for pregnancy related examination in third trimester    Headache     Hoarseness 02/07/2023    Ember Morales  Aug 08, 2022 3:50PM  - following with ENT  - in speech therapy    Kidney stone on left side 04/01/2023    Ember Morales  Nov 28, 2022 9:14AM  - seen on recent x-ray  - no pain currently but patient states she gets these chronically. will refer to urology    Personal history of other infectious and parasitic diseases     History of varicella    Personal history of other specified conditions 09/01/2022    History of headache following lumbar puncture    Personal history of urinary (tract) infections 09/15/2022    History of urinary tract infection    Visual impairment      Past Surgical History:   Procedure Laterality Date    DILATION AND CURETTAGE OF UTERUS  03/03/2016    Dilation And Curettage    KNEE SURGERY  03/03/2016    Knee Surgery    OTHER SURGICAL HISTORY  04/04/2022     Tooth extraction    WISDOM TOOTH EXTRACTION       Family History   Problem Relation Name Age of Onset    Anxiety disorder Mother Mom     Asthma Mother Mom     Hypertension Mother Mom     Other (thyroid problem) Mother Mom     Anxiety disorder Sister      Anxiety disorder Maternal Grandmother Grandma Savannah     Clotting disorder Maternal Grandmother Grandma Savannah     Other (cardiac disorder) Maternal Grandmother Grandma Savannah     Stroke Maternal Grandmother Grandma Savannah     Diabetes Maternal Grandmother Grandma Savannah     Hypertension Maternal Grandmother Grandma Savannah     Heart attack Maternal Grandmother Grandma Savannah     Hyperlipidemia Maternal Grandmother Grandma Savannah     Dementia Maternal Grandmother Grandma Savannah     Cancer Maternal Grandfather Pappy     Graves' disease Paternal Grandfather      Stroke Other Maternal Uncle     Prostate cancer Other Maternal Uncle     Throat cancer Other Paternal Uncle     Breast cancer Paternal Grandmother Grandma duncan      Social History     Tobacco Use    Smoking status: Every Day     Current packs/day: 0.50     Average packs/day: 0.5 packs/day for 10.0 years (5.0 ttl pk-yrs)     Types: Cigarettes    Smokeless tobacco: Never   Vaping Use    Vaping status: Some Days    Substances: Nicotine, Flavoring    Devices: Disposable, Pre-filled or refillable cartridge   Substance Use Topics    Alcohol use: Never    Drug use: Never       Physical Exam   ED Triage Vitals [05/05/24 1058]   Temperature Heart Rate Respirations BP   37.7 °C (99.9 °F) (!) 102 16 102/64      Pulse Ox Temp src Heart Rate Source Patient Position   99 % -- -- --      BP Location FiO2 (%)     -- 21 %       Physical Exam    ED Course & MDM   Diagnoses as of 05/05/24 1435   Pyelonephritis       Medical Decision Making  Labs Reviewed  URINALYSIS WITH REFLEX CULTURE AND MICROSCOPIC - Abnormal     Color, Urine                  Yellow                 Appearance, Urine             Hazy (*)               Specific Gravity, Urine       1.014                   pH, Urine                     6.0                    Protein, Urine                100 (2+) (*)               Glucose, Urine                NEGATIVE                Blood, Urine                    (*)                  Ketones, Urine                20 (1+) (*)               Bilirubin, Urine              NEGATIVE                Urobilinogen, Urine           2.0 (*)                Nitrite, Urine                POSITIVE (*)               Leukocyte Esterase, Urine     LARGE (3+) (*)            CBC WITH AUTO DIFFERENTIAL - Abnormal     WBC                           20.1 (*)               nRBC                          0.0                    RBC                           4.03                   Hemoglobin                    13.0                   Hematocrit                    37.6                   MCV                           93                     MCH                           32.3                   MCHC                          34.6                   RDW                           12.0                   Platelets                     424                    Neutrophils %                 85.6                   Immature Granulocytes %, Automated   0.4                    Lymphocytes %                 6.1                    Monocytes %                   7.7                    Eosinophils %                 0.0                    Basophils %                   0.2                    Neutrophils Absolute          17.20 (*)               Immature Granulocytes Absolute, Au*   0.09                   Lymphocytes Absolute          1.22                   Monocytes Absolute            1.56 (*)               Eosinophils Absolute          0.01                   Basophils Absolute            0.05                COMPREHENSIVE METABOLIC PANEL - Abnormal     Glucose                       129 (*)                Sodium                        136                    Potassium                     3.6                    Chloride                       104                    Bicarbonate                   23                     Anion Gap                     13                     Urea Nitrogen                 11                     Creatinine                    0.66                   eGFR                          >90                    Calcium                       8.5 (*)                Albumin                       4.0                    Alkaline Phosphatase          64                     Total Protein                 6.7                    AST                           13                     Bilirubin, Total              0.4                    ALT                           11                  MICROSCOPIC ONLY, URINE - Abnormal     WBC, Urine                    >50 (*)                WBC Clumps, Urine             MANY                   RBC, Urine                    11-20 (*)               Squamous Epithelial Cells, Urine                          Bacteria, Urine               1+ (*)                 Mucus, Urine                  1+                  HCG, URINE, QUALITATIVE - Normal     HCG, Urine                    NEGATIVE             LACTATE - Normal     Lactate                       1.6                        Narrative: Venipuncture immediately after or during the administration of Metamizole may lead to falsely low results. Testing should be performed immediately                  prior to Metamizole dosing.  URINE CULTURE  URINALYSIS WITH REFLEX CULTURE AND MICROSCOPIC         Narrative: The following orders were created for panel order Urinalysis with Reflex Culture and Microscopic.                  Procedure                               Abnormality         Status                                     ---------                               -----------         ------                                     Urinalysis with Reflex C...[[id number]]  Abnormal            Final result                               Extra Urine Gray Tube[[id number]]                             In process                                                   Please view results for these tests on the individual orders.  EXTRA URINE GRAY TUBE  Seen anything other than abdominal pain  CT abdomen pelvis wo IV contrast   Final Result    1.  Bilateral nonobstructing renal stones measuring up to 0.8 cm in    diameter.    2. Fat stranding around the left kidney, which may be due to recent    passage of a stone; however, superimposed acute pyelonephritis can    not be excluded. No radiopaque stone in the ureters and the urinary    bladder          MACRO:    None          Signed by: Richard Feliciano 5/5/2024 12:13 PM    Dictation workstation:   [id number]  The patient    29-year-old female complains of abdominal and back pain as well as urinary issues.  Differential diagnosis urinary tract infection, pyelonephritis, kidney stone, diverticulitis, appendicitis, pancreatitis, gastroenteritis, constipation.  Examination shows lower abdominal tenderness as well as lower back and CVA tenderness.  Patient has reported urinary complaints.  Patient has low-grade fever, slight tachycardia, blood pressure that is on the low side although this is normal for patient.    CBC CMP lipase lactate urinalysis hCG CT and pelvis ordered.  Given normal saline Zofran morphine IV.  Patient has allergy to codeine and tramadol but says she has taken morphine in the past and believes she can tolerate it.    Laboratory studies show chemistry with glucose 129 with normal electrolytes otherwise with normal renal and liver function, normal lactate, CBC with white blood cell count 20,000, Urinalysis with hazy urine moderate blood positive nitrates large leukocyte Estrace positive bacteria.  CT abdomen pelvis shows stranding around the left kidney which may be due to recent passage of stone but may be superimposed acute pyelonephritis.  There are no obstructing stones seen in ureter.  Patient's presentation appears to be most consistent with  a pyelonephritis.    Patient had significant improvement of symptoms.  Presentation is consistent with acute pyelonephritis with possible underlying stone.  It was recommended to have for patient to be admitted given her lower blood pressure, white blood cell counts and radiographic findings of pyelonephritis.  However patient says that she is feeling better and would like to be treated at home for pyelonephritis.  Given first dose of ciprofloxacin.    Patient will be discharged to home with prescription.  Patient is educated in signs and symptoms of worsening symptoms and reasons to come back to the emergency department.  Will need to follow up with primary care provider.  Patient does not report social determinants of health impacting ability to obtain care that is needed.  Patient agrees with plan.    This is a transcription.  Text was reviewed for errors, but some transcription errors may remain.  Please call for any questions.          Procedure  Procedures     Indio Jain PA-C  05/05/24 6070

## 2024-05-06 ENCOUNTER — TELEMEDICINE (OUTPATIENT)
Dept: PRIMARY CARE | Facility: CLINIC | Age: 29
End: 2024-05-06
Payer: COMMERCIAL

## 2024-05-06 DIAGNOSIS — J30.2 SEASONAL ALLERGIES: ICD-10-CM

## 2024-05-06 DIAGNOSIS — M54.6 CHRONIC THORACIC SPINE PAIN: ICD-10-CM

## 2024-05-06 DIAGNOSIS — G89.29 CHRONIC THORACIC SPINE PAIN: ICD-10-CM

## 2024-05-06 DIAGNOSIS — M54.2 CERVICAL PAIN: ICD-10-CM

## 2024-05-06 DIAGNOSIS — F17.210 CIGARETTE NICOTINE DEPENDENCE WITHOUT COMPLICATION: ICD-10-CM

## 2024-05-06 DIAGNOSIS — F90.9 ADULT ADHD (ATTENTION DEFICIT HYPERACTIVITY DISORDER): Primary | ICD-10-CM

## 2024-05-06 DIAGNOSIS — G89.29 CHRONIC BACK PAIN, UNSPECIFIED BACK LOCATION, UNSPECIFIED BACK PAIN LATERALITY: ICD-10-CM

## 2024-05-06 DIAGNOSIS — M79.7 FIBROMYALGIA: ICD-10-CM

## 2024-05-06 DIAGNOSIS — N12 PYELONEPHRITIS: ICD-10-CM

## 2024-05-06 DIAGNOSIS — E04.2 MULTIPLE THYROID NODULES: ICD-10-CM

## 2024-05-06 DIAGNOSIS — Z13.220 LIPID SCREENING: ICD-10-CM

## 2024-05-06 DIAGNOSIS — M54.9 CHRONIC BACK PAIN, UNSPECIFIED BACK LOCATION, UNSPECIFIED BACK PAIN LATERALITY: ICD-10-CM

## 2024-05-06 PROBLEM — R30.0: Status: RESOLVED | Noted: 2023-10-27 | Resolved: 2024-05-06

## 2024-05-06 PROBLEM — Z3A.36 36 WEEKS GESTATION OF PREGNANCY (HHS-HCC): Status: RESOLVED | Noted: 2023-10-13 | Resolved: 2024-05-06

## 2024-05-06 PROBLEM — O99.013 ANEMIA DURING PREGNANCY IN THIRD TRIMESTER (HHS-HCC): Status: RESOLVED | Noted: 2023-10-04 | Resolved: 2024-05-06

## 2024-05-06 PROBLEM — B37.9 YEAST INFECTION: Status: RESOLVED | Noted: 2023-10-04 | Resolved: 2024-05-06

## 2024-05-06 PROBLEM — O26.893: Status: RESOLVED | Noted: 2023-10-27 | Resolved: 2024-05-06

## 2024-05-06 PROCEDURE — 99214 OFFICE O/P EST MOD 30 MIN: CPT | Performed by: FAMILY MEDICINE

## 2024-05-06 RX ORDER — DEXTROAMPHETAMINE SACCHARATE, AMPHETAMINE ASPARTATE, DEXTROAMPHETAMINE SULFATE AND AMPHETAMINE SULFATE 5; 5; 5; 5 MG/1; MG/1; MG/1; MG/1
20 TABLET ORAL 2 TIMES DAILY
Qty: 60 TABLET | Refills: 0 | Status: SHIPPED | OUTPATIENT
Start: 2024-05-06 | End: 2024-06-05 | Stop reason: SDUPTHER

## 2024-05-06 RX ORDER — OXYCODONE AND ACETAMINOPHEN 5; 325 MG/1; MG/1
1 TABLET ORAL EVERY 12 HOURS
Qty: 60 TABLET | Refills: 0 | Status: SHIPPED | OUTPATIENT
Start: 2024-05-06 | End: 2024-06-05 | Stop reason: SDUPTHER

## 2024-05-06 RX ORDER — HYDROXYZINE HYDROCHLORIDE 10 MG/1
TABLET, FILM COATED ORAL
COMMUNITY
Start: 2024-04-04

## 2024-05-06 RX ORDER — IPRATROPIUM BROMIDE 42 UG/1
2 SPRAY, METERED NASAL 4 TIMES DAILY
COMMUNITY
Start: 2023-12-31 | End: 2024-05-06 | Stop reason: SDUPTHER

## 2024-05-06 RX ORDER — IPRATROPIUM BROMIDE 42 UG/1
2 SPRAY, METERED NASAL 4 TIMES DAILY
Qty: 15 ML | Refills: 3 | Status: SHIPPED | OUTPATIENT
Start: 2024-05-06

## 2024-05-06 ASSESSMENT — ENCOUNTER SYMPTOMS
DYSPHORIC MOOD: 0
ABDOMINAL PAIN: 0
SINUS PRESSURE: 0
PALPITATIONS: 0
WHEEZING: 0
CONSTIPATION: 0
POLYPHAGIA: 0
ADENOPATHY: 0
DIZZINESS: 0
VOMITING: 0
UNEXPECTED WEIGHT CHANGE: 0
NERVOUS/ANXIOUS: 0
FEVER: 0
COUGH: 0
SORE THROAT: 0
HEADACHES: 0
SINUS PAIN: 0
SHORTNESS OF BREATH: 0
FREQUENCY: 0
POLYDIPSIA: 0
HEMATURIA: 0
DIARRHEA: 0
NUMBNESS: 0
CONFUSION: 0
DYSURIA: 0
DIAPHORESIS: 0
NAUSEA: 0
CHEST TIGHTNESS: 0
CHILLS: 0
LIGHT-HEADEDNESS: 0

## 2024-05-06 NOTE — ASSESSMENT & PLAN NOTE
- no longer follows with psychiatry as office moved to [place] and she cannot make it out there.  - continue adderall. I agreed to take over the prescription

## 2024-05-06 NOTE — PROGRESS NOTES
Subjective   Patient ID: Nga Phoenix is a 29 y.o. female who presents for Follow-up.    Virtual or Telephone Consent    An interactive audio and video telecommunication system which permits real time communications between the patient (at the originating site) and provider (at the distant site) was utilized to provide this telehealth service.   Verbal consent was requested and obtained from Nga Phoenix on this date, 05/06/24 for a telehealth visit.      HPI  routine follow up. chronic issues as per assessment and plan.     Patient was seeing psychiatry. Was seeing them out in [place] but her psychiatrist has moved out to [place] and she cannot make it out there. Would like me to take over the adderall.     Was in ED last for abdominal pain, headache, dysuria. Went to ED. Was diagnosed with     OARRS:  Ember Morales MD on 5/6/2024 12:54 PM  I have personally reviewed the OARRS report for Nga Phoenix. I have considered the risks of abuse, dependence, addiction and diversion and I believe that it is clinically appropriate for Nga Phoenix to be prescribed this medication    Is the patient prescribed a combination of a benzodiazepine and opioid?  No    Last Urine Drug Screen / ordered today: No  Recent Results (from the past 8760 hour(s))   Opiate Confirmation, Urine    Collection Time: 07/01/23  9:38 AM   Result Value Ref Range    6-Acetylmorphine <25 Cutoff <25 ng/mL    Codeine <50 Cutoff <50 ng/mL    Hydrocodone <25 Cutoff <25 ng/mL    Hydromorphone <25 Cutoff <25 ng/mL    Morphine Urine <50 Cutoff <50 ng/mL    Norhydrocodone <25 Cutoff <25 ng/mL    Noroxycodone >1000 (A) Cutoff <25 ng/mL    Oxycodone 419 (A) Cutoff <25 ng/mL    Oxymorphone 261 (A) Cutoff <25 ng/mL   Amphetamine Confirm, Urine    Collection Time: 06/30/23  1:47 PM   Result Value Ref Range    Amphetamines,Urine >5000 ng/mL    MDA, Urine <200 ng/mL    MDEA, Urine <200 ng/mL    MDMA, Urine <200 ng/mL    Methamphetamine Quant, Ur <200  ng/mL    Phentermine,Urine <200 ng/mL   Drug Screen, Urine With Reflex to Confirmation    Collection Time: 06/30/23  1:47 PM   Result Value Ref Range    DRUG SCREEN COMMENT URINE SEE BELOW     Amphetamine Screen, Urine PRESUMPTIVE POSITIVE (A) NEGATIVE    Barbiturate Screen, Urine PRESUMPTIVE NEGATIVE NEGATIVE    BENZODIAZEPINE (PRESENCE) IN URINE BY SCREEN METHOD PRESUMPTIVE NEGATIVE NEGATIVE    Cannabinoid Screen, Urine PRESUMPTIVE NEGATIVE NEGATIVE    Cocaine Screen, Urine PRESUMPTIVE NEGATIVE NEGATIVE    Fentanyl, Ur PRESUMPTIVE NEGATIVE NEGATIVE    Methadone Screen, Urine PRESUMPTIVE NEGATIVE NEGATIVE    Opiate Screen, Urine PRESUMPTIVE NEGATIVE NEGATIVE    Oxycodone Screen, Ur PRESUMPTIVE POSITIVE (A) NEGATIVE    PCP Screen, Urine PRESUMPTIVE NEGATIVE NEGATIVE     Results are as expected.         Controlled Substance Agreement: Opioids  Date of the Last Agreement: May 2023  Reviewed Controlled Substance Agreement including but not limited to the benefits, risks, and alternatives to treatment with a Controlled Substance medication(s).    Opioids:  What is the patient's goal of therapy? Reduction of pain  Is this being achieved with current treatment? Yes     I have calculated the patient's Morphine Dose Equivalent (MED): 15  I have considered referral to Pain Management and/or a specialist, and do not feel it is necessary at this time.    I feel that it is clinically indicated to continue this current medication regimen after consideration of alternative therapies, and other non-opioid treatment.    Opioid Risk Screening:  Family History of Substance Abuse  Alcohol: 0 (11/7/2023  2:00 PM)  Illegal Drugs: 0 (11/7/2023  2:00 PM)  Prescription Drugs: 0 (11/7/2023  2:00 PM)    Personal History of Substance Abuse  Alcohol: 0 (11/7/2023  2:00 PM)  Illegal Drugs: 0 (11/7/2023  2:00 PM)  Prescription Drugs: 0 (11/7/2023  2:00 PM)    Patient Age (16-45)  Age (16-45): 1 (11/7/2023  2:00 PM)    History of Preadolescent  Sexual Abuse  History of Preadolescent Sexual Abuse: .0 (11/7/2023  2:00 PM)    Psychological Disease  Attention Deficit Disorder, Obsessive Compulsive Disorder, Bipolar, Schizophrenia: 2 (11/7/2023  2:00 PM)  Depression: 0 (11/7/2023  2:00 PM)    Total Score  Total Score: 3 (11/7/2023  2:00 PM)    Total Score Risk Category  TOTAL SCORE CATEGORY: Low Risk (0-3) (11/7/2023  2:00 PM)        Pain Assessment:  Analgesia  What was your pain level on average during the past week?: 7  What was your pain level at its worst during the past week?: 8  What percentage of your pain has been relieved during the past week?: 50 %  Is the amount of pain relief you are now obtaining from your current pain relievers enough to make a real difference in your life?: Y  Query to Clinician: Is the patient's pain relief clinically significant?: Yes    Activities of Daily Living  Physical Functioning: Better  Family Relationships: Better  Social Relationships: Better  Mood: Better  Sleep Patterns: Better  Overall Functioning: Better    Adverse Events  Is patient experiencing any side effects from current pain relievers?: N      Assessment  Is your overall impression that this patient is benefiting from opioid therapy?: Yes  Specific Analgesic Plan: Continue present regimen      Stimulants:   What is the patient's goal of therapy? Improve focus and concentration    Is this being achieved with current treatment? yes    Activities of Daily Living:   Is your overall impression that this patient is benefiting (symptom reduction outweighs side effects) from stimulant therapy? Yes     1. Physical Functioning: Better  2. Family Relationship: Better  3. Social Relationship: Better  4. Mood: Better  5. Sleep Patterns: Better  6. Overall Function: Better          Review of Systems   Constitutional:  Negative for chills, diaphoresis, fever and unexpected weight change.   HENT:  Negative for congestion, sinus pressure, sinus pain, sneezing and sore  throat.    Respiratory:  Negative for cough, chest tightness, shortness of breath and wheezing.    Cardiovascular:  Negative for chest pain, palpitations and leg swelling.   Gastrointestinal:  Negative for abdominal pain, constipation, diarrhea, nausea and vomiting.   Endocrine: Negative for cold intolerance, heat intolerance, polydipsia, polyphagia and polyuria.   Genitourinary:  Negative for dysuria, frequency, hematuria and urgency.   Neurological:  Negative for dizziness, syncope, light-headedness, numbness and headaches.   Hematological:  Negative for adenopathy.   Psychiatric/Behavioral:  Negative for confusion and dysphoric mood. The patient is not nervous/anxious.          Assessment/Plan   Problem List Items Addressed This Visit       Adult ADHD (attention deficit hyperactivity disorder) - Primary     - no longer follows with psychiatry as office moved to [place] and she cannot make it out there.  - continue adderall. I agreed to take over the prescription         Relevant Medications    amphetamine-dextroamphetamine (Adderall) 20 mg tablet    Other Relevant Orders    Vitamin B12    CBC and Auto Differential    Comprehensive Metabolic Panel    TSH with reflex to Free T4 if abnormal    Back pain, chronic     - controlled on oxycodone/ acetaminophen           Relevant Medications    oxyCODONE-acetaminophen (Percocet) 5-325 mg tablet    Other Relevant Orders    Vitamin B12    CBC and Auto Differential    Comprehensive Metabolic Panel    TSH with reflex to Free T4 if abnormal    Cervical pain     - controlled on oxycodone/ acetaminophen           Relevant Medications    oxyCODONE-acetaminophen (Percocet) 5-325 mg tablet    Other Relevant Orders    Vitamin B12    CBC and Auto Differential    Comprehensive Metabolic Panel    TSH with reflex to Free T4 if abnormal    Chronic thoracic spine pain     - controlled on oxycodone/ acetaminophen           Relevant Medications    oxyCODONE-acetaminophen (Percocet) 5-325 mg  tablet    Other Relevant Orders    Vitamin B12    CBC and Auto Differential    Comprehensive Metabolic Panel    TSH with reflex to Free T4 if abnormal    Cigarette nicotine dependence without complication     - encouraged cessation         Relevant Orders    Vitamin B12    CBC and Auto Differential    Comprehensive Metabolic Panel    TSH with reflex to Free T4 if abnormal    Fibromyalgia     - controlled on oxycodone/ acetaminophen           Relevant Medications    oxyCODONE-acetaminophen (Percocet) 5-325 mg tablet    Other Relevant Orders    Vitamin B12    CBC and Auto Differential    Comprehensive Metabolic Panel    TSH with reflex to Free T4 if abnormal    Multiple thyroid nodules     - seen on US 9/2021  - per report, repeat US at 1, 2, 3, and 5 years  - stable on US 11/2023. Repeat again 11/2024         Relevant Orders    Vitamin B12    CBC and Auto Differential    Comprehensive Metabolic Panel    TSH with reflex to Free T4 if abnormal    Pyelonephritis     - seen in ED yesterday. Given IV fluids and cipro  - white blood cell count 20.1  - continue cipro  - nurse visit in 2 weeks for repeat UA  - Call if symptoms worsen or persist.           Seasonal allergies     - continue atrovent as needed          Relevant Medications    ipratropium (Atrovent) 42 mcg (0.06 %) nasal spray     Other Visit Diagnoses       Lipid screening        Relevant Orders    Lipid Panel

## 2024-05-06 NOTE — ASSESSMENT & PLAN NOTE
- seen in ED yesterday. Given IV fluids and cipro  - white blood cell count 20.1  - continue cipro  - nurse visit in 2 weeks for repeat UA  - Call if symptoms worsen or persist.

## 2024-05-06 NOTE — ASSESSMENT & PLAN NOTE
- seen on US 9/2021  - per report, repeat US at 1, 2, 3, and 5 years  - stable on US 11/2023. Repeat again 11/2024

## 2024-05-07 ENCOUNTER — PATIENT MESSAGE (OUTPATIENT)
Dept: PRIMARY CARE | Facility: CLINIC | Age: 29
End: 2024-05-07
Payer: COMMERCIAL

## 2024-05-07 DIAGNOSIS — N12 PYELONEPHRITIS: Primary | ICD-10-CM

## 2024-05-08 LAB — BACTERIA UR CULT: ABNORMAL

## 2024-05-08 RX ORDER — SULFAMETHOXAZOLE AND TRIMETHOPRIM 800; 160 MG/1; MG/1
1 TABLET ORAL 2 TIMES DAILY
Qty: 20 TABLET | Refills: 0 | Status: SHIPPED | OUTPATIENT
Start: 2024-05-08 | End: 2024-05-18

## 2024-05-09 ENCOUNTER — TELEPHONE (OUTPATIENT)
Dept: PHARMACY | Facility: HOSPITAL | Age: 29
End: 2024-05-09
Payer: COMMERCIAL

## 2024-05-09 NOTE — PROGRESS NOTES
EDPD Note: Antibiotics Reviewed and Warranted    Contacted Ms. Nga Phoenix regarding a positive urine culture that was taken during their recent emergency room visit. The patient was not contacted by the pharmacy team, as result was reviewed and discussed with the patient by her PCP, Dr. Morales on 5/8/24. The patient was seen in the ED for abdominal pain, back pain, and vomiting. She was discharged with ciprofloxacin 500 mg BID x 10 days for the treatment of pyelonephritis. Patient reached out to Dr. Morales on 5/7 to let her know the ciprofloxacin was making her nauseous. Dr. Morales reviewed culture result and sent prescription for Bactrim DS BID x 10 days to take instead of the ciprofloxacin. Patient was agreeable to this. The patient is being treated appropriately with Bactrim DS BID x 10 days.     Susceptibility data from last 90 days.  Collected Specimen Info Organism Ampicillin Cefazolin Cefazolin (uncomplicated UTIs only) Ciprofloxacin Gentamicin Nitrofurantoin Piperacillin/Tazobactam Trimethoprim/Sulfamethoxazole   05/05/24 Urine from Clean Catch/Voided Escherichia coli S S S S S S S S        No further follow up needed from EDPD Team.     Treasure Jerry, PharmD

## 2024-05-16 ENCOUNTER — APPOINTMENT (OUTPATIENT)
Dept: PRIMARY CARE | Facility: CLINIC | Age: 29
End: 2024-05-16
Payer: COMMERCIAL

## 2024-06-05 DIAGNOSIS — G89.29 CHRONIC BACK PAIN, UNSPECIFIED BACK LOCATION, UNSPECIFIED BACK PAIN LATERALITY: ICD-10-CM

## 2024-06-05 DIAGNOSIS — M54.2 CERVICAL PAIN: ICD-10-CM

## 2024-06-05 DIAGNOSIS — G89.29 CHRONIC THORACIC SPINE PAIN: ICD-10-CM

## 2024-06-05 DIAGNOSIS — M54.9 CHRONIC BACK PAIN, UNSPECIFIED BACK LOCATION, UNSPECIFIED BACK PAIN LATERALITY: ICD-10-CM

## 2024-06-05 DIAGNOSIS — M54.6 CHRONIC THORACIC SPINE PAIN: ICD-10-CM

## 2024-06-05 DIAGNOSIS — F90.9 ADULT ADHD (ATTENTION DEFICIT HYPERACTIVITY DISORDER): ICD-10-CM

## 2024-06-05 DIAGNOSIS — M79.7 FIBROMYALGIA: ICD-10-CM

## 2024-06-05 RX ORDER — OXYCODONE AND ACETAMINOPHEN 5; 325 MG/1; MG/1
1 TABLET ORAL EVERY 12 HOURS
Qty: 60 TABLET | Refills: 0 | Status: SHIPPED | OUTPATIENT
Start: 2024-06-05 | End: 2024-07-05

## 2024-06-05 RX ORDER — DEXTROAMPHETAMINE SACCHARATE, AMPHETAMINE ASPARTATE, DEXTROAMPHETAMINE SULFATE AND AMPHETAMINE SULFATE 5; 5; 5; 5 MG/1; MG/1; MG/1; MG/1
20 TABLET ORAL 2 TIMES DAILY
Qty: 60 TABLET | Refills: 0 | Status: SHIPPED | OUTPATIENT
Start: 2024-06-05 | End: 2024-07-05

## 2024-06-05 NOTE — TELEPHONE ENCOUNTER
----- Message from Nga Phoenix sent at 6/5/2024 12:45 PM EDT -----  Regarding: Medication  Contact: [phone]  Hello, I am sending a message to request refills on my medications. I am currently in [place], due to a death in my family. I will not be back in [place] until June 15th or 16th. I was hoping to get my medications refilled so my mother could pick them up and bring them out to [place] for me. I will need a refill for:   Oxycodone/Acetaminophen 5/325mg  And  Adderall IR tablets 20mg  I use the [facility] in [place], [place].  I will schedule an appointment with you when I return home, I'm sorry for any inconvenience.  Thank you!  Nga Phoenix  [phone]

## 2024-06-05 NOTE — TELEPHONE ENCOUNTER
Last appointment: 4/1/2024  Next appointment: 7/16/2024     Eprescribed. I have personally reviewed the OARRS report.  This report is scanned into the electronic medical record.  I have considered the risks of abuse, dependence, addiction and diversion.  I believe that it is clinically appropriate to prescribe this medication.

## 2024-06-18 ENCOUNTER — APPOINTMENT (OUTPATIENT)
Dept: PRIMARY CARE | Facility: CLINIC | Age: 29
End: 2024-06-18
Payer: COMMERCIAL

## 2024-07-08 ENCOUNTER — APPOINTMENT (OUTPATIENT)
Dept: PRIMARY CARE | Facility: CLINIC | Age: 29
End: 2024-07-08
Payer: COMMERCIAL

## 2024-07-08 VITALS — WEIGHT: 117 LBS | TEMPERATURE: 97.8 F | BODY MASS INDEX: 22.11 KG/M2

## 2024-07-08 DIAGNOSIS — Z51.81 THERAPEUTIC DRUG MONITORING: Primary | ICD-10-CM

## 2024-07-08 PROCEDURE — 80354 DRUG SCREENING FENTANYL: CPT

## 2024-07-08 PROCEDURE — 80346 BENZODIAZEPINES1-12: CPT

## 2024-07-08 PROCEDURE — 82570 ASSAY OF URINE CREATININE: CPT

## 2024-07-08 PROCEDURE — 80361 OPIATES 1 OR MORE: CPT

## 2024-07-08 PROCEDURE — 80368 SEDATIVE HYPNOTICS: CPT

## 2024-07-08 PROCEDURE — 80358 DRUG SCREENING METHADONE: CPT

## 2024-07-08 PROCEDURE — 80365 DRUG SCREENING OXYCODONE: CPT

## 2024-07-08 PROCEDURE — 80373 DRUG SCREENING TRAMADOL: CPT

## 2024-07-08 PROCEDURE — 80324 DRUG SCREEN AMPHETAMINES 1/2: CPT

## 2024-07-08 PROCEDURE — 80307 DRUG TEST PRSMV CHEM ANLYZR: CPT

## 2024-07-09 LAB
AMPHETAMINES UR QL SCN: ABNORMAL
BARBITURATES UR QL SCN: ABNORMAL
BZE UR QL SCN: ABNORMAL
CANNABINOIDS UR QL SCN: ABNORMAL
CREAT UR-MCNC: 210.4 MG/DL (ref 20–320)
PCP UR QL SCN: ABNORMAL

## 2024-07-10 LAB

## 2024-07-12 LAB
AMPHET UR-MCNC: 4881 NG/ML
MDA UR-MCNC: <200 NG/ML
MDEA UR-MCNC: <200 NG/ML
MDMA UR-MCNC: <200 NG/ML
METHAMPHET UR-MCNC: <200 NG/ML
PHENTERMINE UR CFM-MCNC: <200 NG/ML

## 2024-07-16 ENCOUNTER — APPOINTMENT (OUTPATIENT)
Dept: PRIMARY CARE | Facility: CLINIC | Age: 29
End: 2024-07-16
Payer: COMMERCIAL

## 2024-08-04 ENCOUNTER — PATIENT MESSAGE (OUTPATIENT)
Dept: PRIMARY CARE | Facility: CLINIC | Age: 29
End: 2024-08-04
Payer: COMMERCIAL

## 2024-08-04 DIAGNOSIS — F90.9 ADULT ADHD (ATTENTION DEFICIT HYPERACTIVITY DISORDER): ICD-10-CM

## 2024-08-05 DIAGNOSIS — M54.9 CHRONIC BACK PAIN, UNSPECIFIED BACK LOCATION, UNSPECIFIED BACK PAIN LATERALITY: ICD-10-CM

## 2024-08-05 DIAGNOSIS — F90.9 ADULT ADHD (ATTENTION DEFICIT HYPERACTIVITY DISORDER): ICD-10-CM

## 2024-08-05 DIAGNOSIS — M54.6 CHRONIC THORACIC SPINE PAIN: ICD-10-CM

## 2024-08-05 DIAGNOSIS — G89.29 CHRONIC BACK PAIN, UNSPECIFIED BACK LOCATION, UNSPECIFIED BACK PAIN LATERALITY: ICD-10-CM

## 2024-08-05 DIAGNOSIS — M79.7 FIBROMYALGIA: ICD-10-CM

## 2024-08-05 DIAGNOSIS — G89.29 CHRONIC THORACIC SPINE PAIN: ICD-10-CM

## 2024-08-05 DIAGNOSIS — Z51.81 THERAPEUTIC DRUG MONITORING: Primary | ICD-10-CM

## 2024-08-05 DIAGNOSIS — M54.2 CERVICAL PAIN: ICD-10-CM

## 2024-08-05 RX ORDER — NALOXONE HYDROCHLORIDE 4 MG/.1ML
1 SPRAY NASAL AS NEEDED
Qty: 2 EACH | Refills: 0 | Status: SHIPPED | OUTPATIENT
Start: 2024-08-05

## 2024-08-05 RX ORDER — OXYCODONE AND ACETAMINOPHEN 5; 325 MG/1; MG/1
1 TABLET ORAL EVERY 12 HOURS
Qty: 60 TABLET | Refills: 0 | Status: SHIPPED | OUTPATIENT
Start: 2024-08-05 | End: 2024-09-04

## 2024-08-05 RX ORDER — DEXTROAMPHETAMINE SACCHARATE, AMPHETAMINE ASPARTATE, DEXTROAMPHETAMINE SULFATE AND AMPHETAMINE SULFATE 5; 5; 5; 5 MG/1; MG/1; MG/1; MG/1
20 TABLET ORAL 2 TIMES DAILY
Qty: 60 TABLET | Refills: 0 | Status: SHIPPED | OUTPATIENT
Start: 2024-08-05 | End: 2024-09-04

## 2024-08-05 RX ORDER — DEXTROAMPHETAMINE SACCHARATE, AMPHETAMINE ASPARTATE, DEXTROAMPHETAMINE SULFATE AND AMPHETAMINE SULFATE 5; 5; 5; 5 MG/1; MG/1; MG/1; MG/1
20 TABLET ORAL 2 TIMES DAILY
Qty: 60 TABLET | Refills: 0 | Status: CANCELLED | OUTPATIENT
Start: 2024-08-05 | End: 2024-09-04

## 2024-08-05 RX ORDER — CYCLOBENZAPRINE HCL 10 MG
10 TABLET ORAL 3 TIMES DAILY PRN
COMMUNITY
End: 2024-08-05 | Stop reason: SDUPTHER

## 2024-08-05 RX ORDER — DEXTROAMPHETAMINE SACCHARATE, AMPHETAMINE ASPARTATE, DEXTROAMPHETAMINE SULFATE AND AMPHETAMINE SULFATE 5; 5; 5; 5 MG/1; MG/1; MG/1; MG/1
20 TABLET ORAL 2 TIMES DAILY
Qty: 60 TABLET | Refills: 0 | Status: SHIPPED | OUTPATIENT
Start: 2024-08-05 | End: 2024-08-05 | Stop reason: SDUPTHER

## 2024-08-05 RX ORDER — CYCLOBENZAPRINE HCL 10 MG
10 TABLET ORAL 3 TIMES DAILY PRN
Qty: 90 TABLET | Refills: 5 | Status: SHIPPED | OUTPATIENT
Start: 2024-08-05 | End: 2025-02-01

## 2024-08-05 NOTE — TELEPHONE ENCOUNTER
Last appointment: 7/8/2024  Next appointment: 8/15/2024     Eprescribed. I have personally reviewed the OARRS report.  This report is scanned into the electronic medical record.  I have considered the risks of abuse, dependence, addiction and diversion.  I believe that it is clinically appropriate to prescribe this medication.

## 2024-08-15 ENCOUNTER — APPOINTMENT (OUTPATIENT)
Dept: PRIMARY CARE | Facility: CLINIC | Age: 29
End: 2024-08-15
Payer: COMMERCIAL

## 2024-08-21 ENCOUNTER — PATIENT MESSAGE (OUTPATIENT)
Dept: PRIMARY CARE | Facility: CLINIC | Age: 29
End: 2024-08-21
Payer: COMMERCIAL

## 2024-08-28 ENCOUNTER — APPOINTMENT (OUTPATIENT)
Dept: PRIMARY CARE | Facility: CLINIC | Age: 29
End: 2024-08-28
Payer: COMMERCIAL

## 2024-09-02 ENCOUNTER — PATIENT MESSAGE (OUTPATIENT)
Dept: PRIMARY CARE | Facility: CLINIC | Age: 29
End: 2024-09-02
Payer: COMMERCIAL

## 2024-09-02 ASSESSMENT — PROMIS GLOBAL HEALTH SCALE
RATE_AVERAGE_FATIGUE: MILD
CARRYOUT_SOCIAL_ACTIVITIES: EXCELLENT
RATE_AVERAGE_PAIN: 5
CARRYOUT_PHYSICAL_ACTIVITIES: MOSTLY
EMOTIONAL_PROBLEMS: RARELY
RATE_QUALITY_OF_LIFE: GOOD
RATE_MENTAL_HEALTH: VERY GOOD
RATE_PHYSICAL_HEALTH: GOOD
RATE_SOCIAL_SATISFACTION: EXCELLENT
RATE_GENERAL_HEALTH: GOOD

## 2024-09-04 ENCOUNTER — TELEPHONE (OUTPATIENT)
Dept: PRIMARY CARE | Facility: CLINIC | Age: 29
End: 2024-09-04

## 2024-09-04 ENCOUNTER — TELEMEDICINE (OUTPATIENT)
Dept: PRIMARY CARE | Facility: CLINIC | Age: 29
End: 2024-09-04
Payer: COMMERCIAL

## 2024-09-04 DIAGNOSIS — G89.29 CHRONIC THORACIC SPINE PAIN: ICD-10-CM

## 2024-09-04 DIAGNOSIS — M79.7 FIBROMYALGIA: ICD-10-CM

## 2024-09-04 DIAGNOSIS — M54.2 CERVICAL PAIN: ICD-10-CM

## 2024-09-04 DIAGNOSIS — F90.9 ADULT ADHD (ATTENTION DEFICIT HYPERACTIVITY DISORDER): Primary | ICD-10-CM

## 2024-09-04 DIAGNOSIS — G89.29 CHRONIC BACK PAIN, UNSPECIFIED BACK LOCATION, UNSPECIFIED BACK PAIN LATERALITY: ICD-10-CM

## 2024-09-04 DIAGNOSIS — E04.2 MULTIPLE THYROID NODULES: ICD-10-CM

## 2024-09-04 DIAGNOSIS — M54.9 CHRONIC BACK PAIN, UNSPECIFIED BACK LOCATION, UNSPECIFIED BACK PAIN LATERALITY: ICD-10-CM

## 2024-09-04 DIAGNOSIS — M54.6 CHRONIC THORACIC SPINE PAIN: ICD-10-CM

## 2024-09-04 DIAGNOSIS — Z13.220 LIPID SCREENING: ICD-10-CM

## 2024-09-04 DIAGNOSIS — R06.2 WHEEZING: ICD-10-CM

## 2024-09-04 DIAGNOSIS — F17.210 CIGARETTE NICOTINE DEPENDENCE WITHOUT COMPLICATION: ICD-10-CM

## 2024-09-04 PROCEDURE — 99442 PR PHYS/QHP TELEPHONE EVALUATION 11-20 MIN: CPT | Performed by: FAMILY MEDICINE

## 2024-09-04 RX ORDER — ALBUTEROL SULFATE 90 UG/1
2 INHALANT RESPIRATORY (INHALATION) EVERY 6 HOURS PRN
Qty: 8 G | Refills: 1 | Status: SHIPPED | OUTPATIENT
Start: 2024-09-04 | End: 2025-09-04

## 2024-09-04 RX ORDER — OXYCODONE AND ACETAMINOPHEN 5; 325 MG/1; MG/1
1 TABLET ORAL EVERY 12 HOURS
Qty: 60 TABLET | Refills: 0 | Status: SHIPPED | OUTPATIENT
Start: 2024-09-04 | End: 2024-10-04

## 2024-09-04 RX ORDER — DEXTROAMPHETAMINE SACCHARATE, AMPHETAMINE ASPARTATE, DEXTROAMPHETAMINE SULFATE AND AMPHETAMINE SULFATE 5; 5; 5; 5 MG/1; MG/1; MG/1; MG/1
20 TABLET ORAL 2 TIMES DAILY
Qty: 60 TABLET | Refills: 0 | Status: SHIPPED | OUTPATIENT
Start: 2024-09-04 | End: 2024-10-04

## 2024-09-04 ASSESSMENT — ENCOUNTER SYMPTOMS
SHORTNESS OF BREATH: 0
NAUSEA: 0
WHEEZING: 0
DIAPHORESIS: 0
FEVER: 0
VOMITING: 0
SINUS PAIN: 0
POLYPHAGIA: 0
CONFUSION: 0
DYSURIA: 0
FREQUENCY: 0
DIARRHEA: 0
ADENOPATHY: 0
SORE THROAT: 0
CHILLS: 0
NUMBNESS: 0
DYSPHORIC MOOD: 0
LIGHT-HEADEDNESS: 0
CONSTIPATION: 0
PALPITATIONS: 0
CHEST TIGHTNESS: 0
COUGH: 0
HEMATURIA: 0
UNEXPECTED WEIGHT CHANGE: 0
SINUS PRESSURE: 0
POLYDIPSIA: 0
NERVOUS/ANXIOUS: 0
DIZZINESS: 0
ABDOMINAL PAIN: 0
HEADACHES: 0

## 2024-09-04 NOTE — PROGRESS NOTES
Subjective   Patient ID: Nga Phoenix is a 29 y.o. female who presents for Follow-up.    Virtual or Telephone Consent    A telephone visit (audio only) between the patient (at the originating site) and the provider (at the distant site) was utilized to provide this telehealth service.   Verbal consent was requested and obtained from Nga Phoenix on this date, 09/04/24 for a telehealth visit.      HPI  routine follow up. chronic issues as per assessment and plan.     OARRS:  Ember Morales MD on 9/4/2024  1:26 PM  I have personally reviewed the OARRS report for Nga Phoenix. I have considered the risks of abuse, dependence, addiction and diversion and I believe that it is clinically appropriate for Nga Phoenix to be prescribed this medication    Is the patient prescribed a combination of a benzodiazepine and opioid?  No    Last Urine Drug Screen / ordered today: No  Recent Results (from the past 8760 hour(s))   Amphetamine Confirm, Urine    Collection Time: 07/08/24 11:31 AM   Result Value Ref Range    Methamphetamine Quant, Ur <200 ng/mL    MDA, Urine <200 ng/mL    MDEA, Urine <200 ng/mL    Phentermine,Urine <200 ng/mL    Amphetamines,Urine 4881 ng/mL    MDMA, Urine <200 ng/mL   Confirmation Opiate/Opioid/Benzo Prescription Compliance    Collection Time: 07/08/24 11:31 AM   Result Value Ref Range    Clonazepam <25 <25 ng/mL    7-Aminoclonazepam <25 <25 ng/mL    Alprazolam <25 <25 ng/mL    Alpha-Hydroxyalprazolam <25 <25 ng/mL    Midazolam <25 <25 ng/mL    Alpha-Hydroxymidazolam <25 <25 ng/mL    Chlordiazepoxide <25 <25 ng/mL    Diazepam <25 <25 ng/mL    Nordiazepam <25 <25 ng/mL    Temazepam <25 <25 ng/mL    Oxazepam <25 <25 ng/mL    Lorazepam <25 <25 ng/mL    Methadone <25 <25 ng/mL    EDDP <25 <25 ng/mL    6-Acetylmorphine <25 <25 ng/mL    Codeine <50 <50 ng/mL    Hydrocodone <25 <25 ng/mL    Hydromorphone <25 <25 ng/mL    Morphine  <50 <50 ng/mL    Norhydrocodone <25 <25 ng/mL    Noroxycodone  >1,000 (H) <25 ng/mL    Oxycodone 840 (H) <25 ng/mL    Oxymorphone >2,500 (H) <25 ng/mL    Fentanyl <2.5 <2.5 ng/mL    Norfentanyl <2.5 <2.5 ng/mL    Tramadol <50 <50 ng/mL    O-Desmethyltramadol <50 <50 ng/mL    Zolpidem <25 <25 ng/mL    Zolpidem Metabolite (ZCA) <25 <25 ng/mL   Screen Opiate/Opioid/Benzo Prescription Compliance    Collection Time: 07/08/24 11:31 AM   Result Value Ref Range    Creatinine, Urine Random 210.4 20.0 - 320.0 mg/dL    Amphetamine Screen, Urine Presumptive Positive (A) Presumptive Negative    Barbiturate Screen, Urine Presumptive Negative Presumptive Negative    Cannabinoid Screen, Urine Presumptive Negative Presumptive Negative    Cocaine Metabolite Screen, Urine Presumptive Negative Presumptive Negative    PCP Screen, Urine Presumptive Negative Presumptive Negative     Results are as expected.         Controlled Substance Agreement: Opioids and Stimulants   Date of the Last Agreement: 5/6/24  Reviewed Controlled Substance Agreement including but not limited to the benefits, risks, and alternatives to treatment with a Controlled Substance medication(s).    Opioids:  What is the patient's goal of therapy? Reduction of pain  Is this being achieved with current treatment? Yes     I have calculated the patient's Morphine Dose Equivalent (MED): 15  I have considered referral to Pain Management and/or a specialist, and do not feel it is necessary at this time.    I feel that it is clinically indicated to continue this current medication regimen after consideration of alternative therapies, and other non-opioid treatment.    Opioid Risk Screening:  Family History of Substance Abuse  Alcohol: 0 (11/7/2023  2:00 PM)  Illegal Drugs: 0 (11/7/2023  2:00 PM)  Prescription Drugs: 0 (11/7/2023  2:00 PM)    Personal History of Substance Abuse  Alcohol: 0 (11/7/2023  2:00 PM)  Illegal Drugs: 0 (11/7/2023  2:00 PM)  Prescription Drugs: 0 (11/7/2023  2:00 PM)    Patient Age (16-45)  Age (16-45): 1  (11/7/2023  2:00 PM)    History of Preadolescent Sexual Abuse  History of Preadolescent Sexual Abuse: .0 (11/7/2023  2:00 PM)    Psychological Disease  Attention Deficit Disorder, Obsessive Compulsive Disorder, Bipolar, Schizophrenia: 2 (11/7/2023  2:00 PM)  Depression: 0 (11/7/2023  2:00 PM)    Total Score  Total Score: 3 (11/7/2023  2:00 PM)    Total Score Risk Category  TOTAL SCORE CATEGORY: Low Risk (0-3) (11/7/2023  2:00 PM)        Pain Assessment:  Analgesia  What was your pain level on average during the past week?: 7  What was your pain level at its worst during the past week?: 8  What percentage of your pain has been relieved during the past week?: 50 %  Is the amount of pain relief you are now obtaining from your current pain relievers enough to make a real difference in your life?: Y  Query to Clinician: Is the patient's pain relief clinically significant?: Yes    Activities of Daily Living  Physical Functioning: Better  Family Relationships: Better  Social Relationships: Better  Mood: Better  Sleep Patterns: Better  Overall Functioning: Better    Adverse Events  Is patient experiencing any side effects from current pain relievers?: N      Assessment  Is your overall impression that this patient is benefiting from opioid therapy?: Yes  Specific Analgesic Plan: Continue present regimen        Stimulants:   What is the patient's goal of therapy? Improve focus and concentration  Is this being achieved with current treatment? yes    Activities of Daily Living:   Is your overall impression that this patient is benefiting (symptom reduction outweighs side effects) from stimulant therapy? Yes     1. Physical Functioning: Better  2. Family Relationship: Better  3. Social Relationship: Better  4. Mood: Better  5. Sleep Patterns: Better  6. Overall Function: Better      Review of Systems   Constitutional:  Negative for chills, diaphoresis, fever and unexpected weight change.   HENT:  Negative for congestion, sinus  pressure, sinus pain, sneezing and sore throat.    Respiratory:  Negative for cough, chest tightness, shortness of breath and wheezing.    Cardiovascular:  Negative for chest pain, palpitations and leg swelling.   Gastrointestinal:  Negative for abdominal pain, constipation, diarrhea, nausea and vomiting.   Endocrine: Negative for cold intolerance, heat intolerance, polydipsia, polyphagia and polyuria.   Genitourinary:  Negative for dysuria, frequency, hematuria and urgency.   Neurological:  Negative for dizziness, syncope, light-headedness, numbness and headaches.   Hematological:  Negative for adenopathy.   Psychiatric/Behavioral:  Negative for confusion and dysphoric mood. The patient is not nervous/anxious.          Assessment/Plan   Problem List Items Addressed This Visit       Adult ADHD (attention deficit hyperactivity disorder) - Primary     - controlled. Continue adderall          Relevant Medications    amphetamine-dextroamphetamine (Adderall) 20 mg tablet    Other Relevant Orders    Vitamin B12    CBC and Auto Differential    Comprehensive Metabolic Panel    TSH with reflex to Free T4 if abnormal    Back pain, chronic     - controlled on oxycodone/ acetaminophen           Relevant Medications    oxyCODONE-acetaminophen (Percocet) 5-325 mg tablet    Other Relevant Orders    Vitamin B12    CBC and Auto Differential    Comprehensive Metabolic Panel    TSH with reflex to Free T4 if abnormal    Cervical pain     - controlled on oxycodone/ acetaminophen           Relevant Medications    oxyCODONE-acetaminophen (Percocet) 5-325 mg tablet    Other Relevant Orders    Vitamin B12    CBC and Auto Differential    Comprehensive Metabolic Panel    TSH with reflex to Free T4 if abnormal    Chronic thoracic spine pain     - controlled on oxycodone/ acetaminophen           Relevant Medications    oxyCODONE-acetaminophen (Percocet) 5-325 mg tablet    Other Relevant Orders    Vitamin B12    CBC and Auto Differential     Comprehensive Metabolic Panel    TSH with reflex to Free T4 if abnormal    Cigarette nicotine dependence without complication    Relevant Orders    Vitamin B12    CBC and Auto Differential    Comprehensive Metabolic Panel    TSH with reflex to Free T4 if abnormal    Fibromyalgia     - controlled on oxycodone/ acetaminophen           Relevant Medications    oxyCODONE-acetaminophen (Percocet) 5-325 mg tablet    Other Relevant Orders    Vitamin B12    CBC and Auto Differential    Comprehensive Metabolic Panel    TSH with reflex to Free T4 if abnormal    Multiple thyroid nodules     - seen on US 9/2021  - per report, repeat US at 1, 2, 3, and 5 years  - stable on US 11/2023. Repeat again 11/2024         Relevant Orders    Vitamin B12    CBC and Auto Differential    Comprehensive Metabolic Panel    TSH with reflex to Free T4 if abnormal    Wheezing     - secondary to allergies           Relevant Medications    albuterol (Ventolin HFA) 90 mcg/actuation inhaler     Other Visit Diagnoses       Lipid screening        Relevant Orders    Lipid Panel            This telephone visit lasted approximately 12 minutes.

## 2024-09-04 NOTE — PATIENT INSTRUCTIONS
Nga Phoenix ,    Thank you for coming in today. We at [facility] appreciate your trust in our care. If you have any questions or concerns about the care you received today, please do not hesitate to contact us at [phone].    The following instructions were discussed today:    - get labs. For blood work: Nothing to eat or drink for at least 10 hours prior. Okay for water or black coffee.   - Follow up in 3 months.

## 2024-09-05 NOTE — TELEPHONE ENCOUNTER
Patient was offered an appointment at the end of November and couldn't take it due to her kids, she is scheduled for December 10th later in the morning. Patient was advised twice on the phone that if she No Shows or cancels the appointment we won't get any refills until we can get her back in. Patient advised Dr. Morales is scheduling out four months and we are squeezing her in and it's important she keep scheduled appts.

## 2024-09-30 ENCOUNTER — TELEPHONE (OUTPATIENT)
Dept: PRIMARY CARE | Facility: CLINIC | Age: 29
End: 2024-09-30
Payer: COMMERCIAL

## 2024-09-30 DIAGNOSIS — M54.6 CHRONIC THORACIC SPINE PAIN: ICD-10-CM

## 2024-09-30 DIAGNOSIS — G89.29 CHRONIC THORACIC SPINE PAIN: ICD-10-CM

## 2024-09-30 DIAGNOSIS — F90.9 ADULT ADHD (ATTENTION DEFICIT HYPERACTIVITY DISORDER): ICD-10-CM

## 2024-09-30 DIAGNOSIS — G89.29 CHRONIC BACK PAIN, UNSPECIFIED BACK LOCATION, UNSPECIFIED BACK PAIN LATERALITY: ICD-10-CM

## 2024-09-30 DIAGNOSIS — M54.2 CERVICAL PAIN: ICD-10-CM

## 2024-09-30 DIAGNOSIS — M79.7 FIBROMYALGIA: ICD-10-CM

## 2024-09-30 DIAGNOSIS — M54.9 CHRONIC BACK PAIN, UNSPECIFIED BACK LOCATION, UNSPECIFIED BACK PAIN LATERALITY: ICD-10-CM

## 2024-09-30 RX ORDER — OXYCODONE AND ACETAMINOPHEN 5; 325 MG/1; MG/1
1 TABLET ORAL EVERY 12 HOURS
Qty: 60 TABLET | Refills: 0 | Status: SHIPPED | OUTPATIENT
Start: 2024-09-30 | End: 2024-10-30

## 2024-09-30 RX ORDER — DEXTROAMPHETAMINE SACCHARATE, AMPHETAMINE ASPARTATE, DEXTROAMPHETAMINE SULFATE AND AMPHETAMINE SULFATE 5; 5; 5; 5 MG/1; MG/1; MG/1; MG/1
20 TABLET ORAL 2 TIMES DAILY
Qty: 60 TABLET | Refills: 0 | Status: SHIPPED | OUTPATIENT
Start: 2024-09-30 | End: 2024-10-30

## 2024-09-30 NOTE — TELEPHONE ENCOUNTER
I will need a refill soon for my Adderall 20mg tablets, Oxycodone/acetaminophen 5/325 tablets sent to the [facility] on [address], in [place] please and thank you!     MEDICATION NAME: Adderall and oxycodone acet. (Pended)  STRENGTH:  DIRECTIONS:  PHARMACY: [facility] [address]   LAST OV:  virtual 9.4.24, in office 11.7.23 (cancelled or no showed all other appt's)  NEXT OV: 12.10.24

## 2024-09-30 NOTE — TELEPHONE ENCOUNTER
Last appointment: 9/4/2024  Next appointment: 12/10/2024     Eprescribed. I have personally reviewed the OARRS report.  This report is scanned into the electronic medical record.  I have considered the risks of abuse, dependence, addiction and diversion.  I believe that it is clinically appropriate to prescribe this medication.

## 2024-10-02 ENCOUNTER — TELEPHONE (OUTPATIENT)
Dept: PRIMARY CARE | Facility: CLINIC | Age: 29
End: 2024-10-02
Payer: COMMERCIAL

## 2024-10-02 NOTE — TELEPHONE ENCOUNTER
Called and spoke with pharmacist. Patient has degenerative disc disease. Has seen pain management and they initiated treatment.

## 2024-10-02 NOTE — TELEPHONE ENCOUNTER
Nevaeh from [facility] in [place], calling to see if she can get some more documentation on why she is taking oxycodone, since she is so young. [phone]

## 2024-10-24 DIAGNOSIS — R06.2 WHEEZING: ICD-10-CM

## 2024-10-24 RX ORDER — ALBUTEROL SULFATE 90 UG/1
2 INHALANT RESPIRATORY (INHALATION) EVERY 6 HOURS PRN
Refills: 1 | OUTPATIENT
Start: 2024-10-24

## 2024-11-04 ENCOUNTER — TELEPHONE (OUTPATIENT)
Dept: PRIMARY CARE | Facility: CLINIC | Age: 29
End: 2024-11-04
Payer: COMMERCIAL

## 2024-11-04 DIAGNOSIS — M54.6 CHRONIC THORACIC SPINE PAIN: ICD-10-CM

## 2024-11-04 DIAGNOSIS — F90.9 ADULT ADHD (ATTENTION DEFICIT HYPERACTIVITY DISORDER): ICD-10-CM

## 2024-11-04 DIAGNOSIS — M54.2 CERVICAL PAIN: ICD-10-CM

## 2024-11-04 DIAGNOSIS — G89.29 CHRONIC BACK PAIN, UNSPECIFIED BACK LOCATION, UNSPECIFIED BACK PAIN LATERALITY: ICD-10-CM

## 2024-11-04 DIAGNOSIS — M79.7 FIBROMYALGIA: ICD-10-CM

## 2024-11-04 DIAGNOSIS — G89.29 CHRONIC THORACIC SPINE PAIN: ICD-10-CM

## 2024-11-04 DIAGNOSIS — M54.9 CHRONIC BACK PAIN, UNSPECIFIED BACK LOCATION, UNSPECIFIED BACK PAIN LATERALITY: ICD-10-CM

## 2024-11-04 RX ORDER — OXYCODONE AND ACETAMINOPHEN 5; 325 MG/1; MG/1
1 TABLET ORAL EVERY 12 HOURS
Qty: 60 TABLET | Refills: 0 | Status: SHIPPED | OUTPATIENT
Start: 2024-11-04 | End: 2024-12-04

## 2024-11-04 RX ORDER — DEXTROAMPHETAMINE SACCHARATE, AMPHETAMINE ASPARTATE, DEXTROAMPHETAMINE SULFATE AND AMPHETAMINE SULFATE 5; 5; 5; 5 MG/1; MG/1; MG/1; MG/1
20 TABLET ORAL 2 TIMES DAILY
Qty: 60 TABLET | Refills: 0 | Status: SHIPPED | OUTPATIENT
Start: 2024-11-04 | End: 2024-12-04

## 2024-11-04 NOTE — TELEPHONE ENCOUNTER
Last appointment: 10/2/2024  Next appointment: 12/10/2024     Eprescribed. I have personally reviewed the OARRS report.  This report is scanned into the electronic medical record.  I have considered the risks of abuse, dependence, addiction and diversion.  I believe that it is clinically appropriate to prescribe this medication.

## 2024-11-04 NOTE — TELEPHONE ENCOUNTER
I will need my prescriptions sent to [facility] pharmacy on [address], please and thank you.   I will need my oxycodone/acetaminophen 5/325 tablets and my Adderall 20mg IR tablets.      Thanks again!   Nga Phoenix     MEDICATION NAME: Adderall and Percocet (pended)  STRENGTH:  DIRECTIONS:  PHARMACY: [facility]  LAST OV:  virtual 9.4.24,   last seen in office on 11.7.23  NEXT OV: 12.10.24

## 2024-11-08 ENCOUNTER — APPOINTMENT (OUTPATIENT)
Dept: UROLOGY | Facility: CLINIC | Age: 29
End: 2024-11-08
Payer: COMMERCIAL

## 2024-11-08 NOTE — PROGRESS NOTES
Subjective   Patient ID: Nga Phoenix is a 29 y.o. female who presents for No chief complaint on file..  HPI  29 y.o.  patient presenting as a referral from    with complaints of    Review of Systems  Constitutional: No fever, No chills and No fatigue.   Eyes: No vision problems and No dryness of the eyes.   ENT: No dry mouth, No hearing loss and No nosebleeds.   Cardiovascular: No chest pain, No palpitations and No orthopnea.   Respiratory: No shortness of breath, No cough and No wheezing.   Gastrointestinal: No abdominal pain, No constipation, No nausea, No diarrhea, No vomiting and No melena.   Genitourinary: As noted in HPI.   Musculoskeletal: No back pain, No myalgias, No muscle weakness, No joint swelling and No leg edema.   Integumentary: No rashes, No skin lesion and No itching.   Neurological: No headache, No numbness and No dizziness.   Psychiatric: No sleep disturbances, No anxiety and No depression.   Endocrine: No hot flashes, No loss of hair and No hirsutism.   Hematologic/Lymphatic: No swollen glands, No tendency for easy bleeding and No tendency for easy bruising.   All other systems have been reviewed and are negative for complaint.        Objective   Physical Exam  PHYSICAL EXAMINATION:  No LMP recorded.  There is no height or weight on file to calculate BMI.  There were no vitals taken for this visit.  General Appearance: well appearing  Neuro: Alert and oriented   HEENT: mucous membranes moist, neck supple  Resp: No respiratory distress, normal work of breathing  MSK: normal range of motion, gait appropriate    Pelvic:  Genitourinary: *** normal external genitalia, Bartholin's glands negative, [place]'s glands negative  Urethra  *** normal meatus, non-tender, no periurethral mass  Vaginal mucosa *** normal  Cervix surgically absent*** normal  Uterus surgically absent*** normal size, non-tender, mobile  Adnexae *** negative nontender, no masses  Atrophy {POSITIVE/NEGATIVE:49998}    CST  {POSITIVE/NEGATIVE:74733}  Pelvic floor muscle contraction  ***/5    POP-Q (in supine position):       Aa ***     Ba ***     C ***              gh ***     pb ***     tvl ***              Ap ***     Bp ***     D ***    Rectal: no hemorrhoids, fissures or masses***    Assessment/Plan         Scribe Attestation  By signing my name below, ISarah Scribe attest that this documentation has been prepared under the direction and in the presence of Berlin Downs MD. All medical record entries made by the Scribe were at my direction or personally dictated by me. I have reviewed the chart and agree that the record accurately reflects my personal performance of the history, physical exam, discussion and plan.

## 2024-12-02 ENCOUNTER — TELEPHONE (OUTPATIENT)
Dept: PRIMARY CARE | Facility: CLINIC | Age: 29
End: 2024-12-02
Payer: COMMERCIAL

## 2024-12-02 DIAGNOSIS — F90.9 ADULT ADHD (ATTENTION DEFICIT HYPERACTIVITY DISORDER): ICD-10-CM

## 2024-12-02 DIAGNOSIS — G89.29 CHRONIC THORACIC SPINE PAIN: ICD-10-CM

## 2024-12-02 DIAGNOSIS — G89.29 CHRONIC BACK PAIN, UNSPECIFIED BACK LOCATION, UNSPECIFIED BACK PAIN LATERALITY: ICD-10-CM

## 2024-12-02 DIAGNOSIS — M54.9 CHRONIC BACK PAIN, UNSPECIFIED BACK LOCATION, UNSPECIFIED BACK PAIN LATERALITY: ICD-10-CM

## 2024-12-02 DIAGNOSIS — M54.6 CHRONIC THORACIC SPINE PAIN: ICD-10-CM

## 2024-12-02 DIAGNOSIS — M54.2 CERVICAL PAIN: ICD-10-CM

## 2024-12-02 DIAGNOSIS — M79.7 FIBROMYALGIA: ICD-10-CM

## 2024-12-02 RX ORDER — OXYCODONE AND ACETAMINOPHEN 5; 325 MG/1; MG/1
1 TABLET ORAL EVERY 12 HOURS
Qty: 60 TABLET | Refills: 0 | Status: SHIPPED | OUTPATIENT
Start: 2024-12-02 | End: 2025-01-01

## 2024-12-02 RX ORDER — DEXTROAMPHETAMINE SACCHARATE, AMPHETAMINE ASPARTATE, DEXTROAMPHETAMINE SULFATE AND AMPHETAMINE SULFATE 5; 5; 5; 5 MG/1; MG/1; MG/1; MG/1
20 TABLET ORAL 2 TIMES DAILY
Qty: 60 TABLET | Refills: 0 | Status: SHIPPED | OUTPATIENT
Start: 2024-12-02 | End: 2025-01-01

## 2024-12-02 NOTE — TELEPHONE ENCOUNTER
Hello, I will need a refill of my prescriptions soon, please.   The refills I will need, are: Oxycodone/Acetaminophen 5/325 tablets  And  Amphetamine Salts 20mg tablets     Please send the prescriptions to [facility] pharmacy.   Address: [address] [place] [address]   Pharmacy Phone: [phone]     Thank you and have a blessed day!!  Nga Phoenix  [phone]  [email or web address]     MEDICATION NAME: Percocet and Adderal (pended)  STRENGTH:  DIRECTIONS:  PHARMACY:  LAST OV:  9.4.24 (virtual) last in person was last November 2023  NEXT OV: 12/10/24 (in person, has been over a year since last inperson)

## 2024-12-09 ASSESSMENT — ENCOUNTER SYMPTOMS
ABDOMINAL PAIN: 0
DIAPHORESIS: 0
POLYDIPSIA: 0
UNEXPECTED WEIGHT CHANGE: 0
NECK PAIN: 1
COUGH: 0
FREQUENCY: 0
LIGHT-HEADEDNESS: 0
BACK PAIN: 1
DIZZINESS: 0
SORE THROAT: 0
NUMBNESS: 0
ADENOPATHY: 0
FEVER: 0
NAUSEA: 0
DIARRHEA: 0
SINUS PAIN: 0
CONFUSION: 0
VOMITING: 0
WHEEZING: 0
CHEST TIGHTNESS: 0
SINUS PRESSURE: 0
DYSPHORIC MOOD: 0
CHILLS: 0
NERVOUS/ANXIOUS: 0
SHORTNESS OF BREATH: 0
HEADACHES: 0
POLYPHAGIA: 0
CONSTIPATION: 0
DYSURIA: 0
PALPITATIONS: 0
HEMATURIA: 0

## 2024-12-10 ENCOUNTER — APPOINTMENT (OUTPATIENT)
Dept: PRIMARY CARE | Facility: CLINIC | Age: 29
End: 2024-12-10
Payer: COMMERCIAL

## 2024-12-10 VITALS
DIASTOLIC BLOOD PRESSURE: 77 MMHG | SYSTOLIC BLOOD PRESSURE: 111 MMHG | WEIGHT: 114 LBS | RESPIRATION RATE: 16 BRPM | OXYGEN SATURATION: 99 % | HEART RATE: 102 BPM | BODY MASS INDEX: 21.52 KG/M2 | HEIGHT: 61 IN

## 2024-12-10 DIAGNOSIS — Z13.220 LIPID SCREENING: ICD-10-CM

## 2024-12-10 DIAGNOSIS — G89.29 CHRONIC BACK PAIN, UNSPECIFIED BACK LOCATION, UNSPECIFIED BACK PAIN LATERALITY: ICD-10-CM

## 2024-12-10 DIAGNOSIS — J30.2 SEASONAL ALLERGIES: ICD-10-CM

## 2024-12-10 DIAGNOSIS — R11.0 NAUSEA: ICD-10-CM

## 2024-12-10 DIAGNOSIS — F90.9 ADULT ADHD (ATTENTION DEFICIT HYPERACTIVITY DISORDER): Primary | ICD-10-CM

## 2024-12-10 DIAGNOSIS — E55.9 VITAMIN D DEFICIENCY: ICD-10-CM

## 2024-12-10 DIAGNOSIS — F17.210 CIGARETTE NICOTINE DEPENDENCE WITHOUT COMPLICATION: ICD-10-CM

## 2024-12-10 DIAGNOSIS — M79.7 FIBROMYALGIA: ICD-10-CM

## 2024-12-10 DIAGNOSIS — M54.2 CERVICAL PAIN: ICD-10-CM

## 2024-12-10 DIAGNOSIS — E04.2 MULTIPLE THYROID NODULES: ICD-10-CM

## 2024-12-10 DIAGNOSIS — Z23 ENCOUNTER FOR IMMUNIZATION: ICD-10-CM

## 2024-12-10 DIAGNOSIS — M54.9 CHRONIC BACK PAIN, UNSPECIFIED BACK LOCATION, UNSPECIFIED BACK PAIN LATERALITY: ICD-10-CM

## 2024-12-10 DIAGNOSIS — M54.6 CHRONIC THORACIC SPINE PAIN: ICD-10-CM

## 2024-12-10 DIAGNOSIS — L70.0 ACNE VULGARIS: ICD-10-CM

## 2024-12-10 DIAGNOSIS — G89.29 CHRONIC THORACIC SPINE PAIN: ICD-10-CM

## 2024-12-10 PROCEDURE — 90677 PCV20 VACCINE IM: CPT | Performed by: FAMILY MEDICINE

## 2024-12-10 PROCEDURE — 99214 OFFICE O/P EST MOD 30 MIN: CPT | Performed by: FAMILY MEDICINE

## 2024-12-10 PROCEDURE — 90471 IMMUNIZATION ADMIN: CPT | Performed by: FAMILY MEDICINE

## 2024-12-10 PROCEDURE — 3008F BODY MASS INDEX DOCD: CPT | Performed by: FAMILY MEDICINE

## 2024-12-10 PROCEDURE — 4004F PT TOBACCO SCREEN RCVD TLK: CPT | Performed by: FAMILY MEDICINE

## 2024-12-10 RX ORDER — ERYTHROMYCIN AND BENZOYL PEROXIDE 30; 50 MG/G; MG/G
GEL TOPICAL 2 TIMES DAILY
Qty: 46.6 G | Refills: 2 | Status: SHIPPED | OUTPATIENT
Start: 2024-12-10 | End: 2025-04-09

## 2024-12-10 RX ORDER — ONDANSETRON 4 MG/1
4 TABLET, ORALLY DISINTEGRATING ORAL EVERY 8 HOURS PRN
Qty: 20 TABLET | Refills: 0 | Status: SHIPPED | OUTPATIENT
Start: 2024-12-10

## 2024-12-10 RX ORDER — CYCLOBENZAPRINE HCL 10 MG
10 TABLET ORAL 3 TIMES DAILY PRN
Qty: 90 TABLET | Refills: 5 | Status: SHIPPED | OUTPATIENT
Start: 2024-12-10 | End: 2025-06-08

## 2024-12-10 ASSESSMENT — ANXIETY QUESTIONNAIRES
3. WORRYING TOO MUCH ABOUT DIFFERENT THINGS: NOT AT ALL
IF YOU CHECKED OFF ANY PROBLEMS ON THIS QUESTIONNAIRE, HOW DIFFICULT HAVE THESE PROBLEMS MADE IT FOR YOU TO DO YOUR WORK, TAKE CARE OF THINGS AT HOME, OR GET ALONG WITH OTHER PEOPLE: SOMEWHAT DIFFICULT
5. BEING SO RESTLESS THAT IT IS HARD TO SIT STILL: MORE THAN HALF THE DAYS
GAD7 TOTAL SCORE: 4
2. NOT BEING ABLE TO STOP OR CONTROL WORRYING: NOT AT ALL
7. FEELING AFRAID AS IF SOMETHING AWFUL MIGHT HAPPEN: NOT AT ALL
1. FEELING NERVOUS, ANXIOUS, OR ON EDGE: SEVERAL DAYS
4. TROUBLE RELAXING: SEVERAL DAYS
6. BECOMING EASILY ANNOYED OR IRRITABLE: NOT AT ALL

## 2024-12-10 ASSESSMENT — PATIENT HEALTH QUESTIONNAIRE - PHQ9
9. THOUGHTS THAT YOU WOULD BE BETTER OFF DEAD, OR OF HURTING YOURSELF: NOT AT ALL
SUM OF ALL RESPONSES TO PHQ9 QUESTIONS 1 & 2: 1
2. FEELING DOWN, DEPRESSED OR HOPELESS: NOT AT ALL
8. MOVING OR SPEAKING SO SLOWLY THAT OTHER PEOPLE COULD HAVE NOTICED. OR THE OPPOSITE, BEING SO FIGETY OR RESTLESS THAT YOU HAVE BEEN MOVING AROUND A LOT MORE THAN USUAL: NOT AT ALL
5. POOR APPETITE OR OVEREATING: NOT AT ALL
1. LITTLE INTEREST OR PLEASURE IN DOING THINGS: SEVERAL DAYS
6. FEELING BAD ABOUT YOURSELF - OR THAT YOU ARE A FAILURE OR HAVE LET YOURSELF OR YOUR FAMILY DOWN: NOT AT ALL
3. TROUBLE FALLING OR STAYING ASLEEP: MORE THAN HALF THE DAYS
10. IF YOU CHECKED OFF ANY PROBLEMS, HOW DIFFICULT HAVE THESE PROBLEMS MADE IT FOR YOU TO DO YOUR WORK, TAKE CARE OF THINGS AT HOME, OR GET ALONG WITH OTHER PEOPLE: SOMEWHAT DIFFICULT
7. TROUBLE CONCENTRATING ON THINGS, SUCH AS READING THE NEWSPAPER OR WATCHING TELEVISION: NOT AT ALL
4. FEELING TIRED OR HAVING LITTLE ENERGY: MORE THAN HALF THE DAYS
SUM OF ALL RESPONSES TO PHQ QUESTIONS 1-9: 5

## 2024-12-10 ASSESSMENT — LIFESTYLE VARIABLES: TOTAL SCORE: 4

## 2024-12-10 NOTE — PROGRESS NOTES
Subjective   Patient ID: Nga Phoenix is a 29 y.o. female who presents for follow up.    HPI   routine follow up. chronic issues as per assessment and plan.     Patient concerned about acne. Mostly on her face. Some over her back     OARRS:  Ember Morales MD on 12/9/2024  9:52 PM  I have personally reviewed the OARRS report for Nga Phoenix. I have considered the risks of abuse, dependence, addiction and diversion    Is the patient prescribed a combination of a benzodiazepine and opioid?  No    Last Urine Drug Screen / ordered today: No  Recent Results (from the past 8760 hours)   Amphetamine Confirm, Urine    Collection Time: 07/08/24 11:31 AM   Result Value Ref Range    Methamphetamine Quant, Ur <200 ng/mL    MDA, Urine <200 ng/mL    MDEA, Urine <200 ng/mL    Phentermine,Urine <200 ng/mL    Amphetamines,Urine 4881 ng/mL    MDMA, Urine <200 ng/mL   Confirmation Opiate/Opioid/Benzo Prescription Compliance    Collection Time: 07/08/24 11:31 AM   Result Value Ref Range    Clonazepam <25 <25 ng/mL    7-Aminoclonazepam <25 <25 ng/mL    Alprazolam <25 <25 ng/mL    Alpha-Hydroxyalprazolam <25 <25 ng/mL    Midazolam <25 <25 ng/mL    Alpha-Hydroxymidazolam <25 <25 ng/mL    Chlordiazepoxide <25 <25 ng/mL    Diazepam <25 <25 ng/mL    Nordiazepam <25 <25 ng/mL    Temazepam <25 <25 ng/mL    Oxazepam <25 <25 ng/mL    Lorazepam <25 <25 ng/mL    Methadone <25 <25 ng/mL    EDDP <25 <25 ng/mL    6-Acetylmorphine <25 <25 ng/mL    Codeine <50 <50 ng/mL    Hydrocodone <25 <25 ng/mL    Hydromorphone <25 <25 ng/mL    Morphine  <50 <50 ng/mL    Norhydrocodone <25 <25 ng/mL    Noroxycodone >1,000 (H) <25 ng/mL    Oxycodone 840 (H) <25 ng/mL    Oxymorphone >2,500 (H) <25 ng/mL    Fentanyl <2.5 <2.5 ng/mL    Norfentanyl <2.5 <2.5 ng/mL    Tramadol <50 <50 ng/mL    O-Desmethyltramadol <50 <50 ng/mL    Zolpidem <25 <25 ng/mL    Zolpidem Metabolite (ZCA) <25 <25 ng/mL   Screen Opiate/Opioid/Benzo Prescription Compliance    Collection  Time: 24 11:31 AM   Result Value Ref Range    Creatinine, Urine Random 210.4 20.0 - 320.0 mg/dL    Amphetamine Screen, Urine Presumptive Positive (A) Presumptive Negative    Barbiturate Screen, Urine Presumptive Negative Presumptive Negative    Cannabinoid Screen, Urine Presumptive Negative Presumptive Negative    Cocaine Metabolite Screen, Urine Presumptive Negative Presumptive Negative    PCP Screen, Urine Presumptive Negative Presumptive Negative     Results are as expected.         Controlled Substance Agreement: Opioids and Stimulants   Date of the Last Agreement: May 2024  Reviewed Controlled Substance Agreement including but not limited to the benefits, risks, and alternatives to treatment with a Controlled Substance medication(s).    Opioids:  What is the patient's goal of therapy? Reduction of pain  Is this being achieved with current treatment? yes    I have calculated the patient's Morphine Dose Equivalent (MED): 15  I have considered referral to Pain Management and/or a specialist, and do not feel it is necessary at this time.    I feel that it is clinically indicated to continue this current medication regimen after consideration of alternative therapies, and other non-opioid treatment.    Opioid Risk Screening:  Family History of Substance Abuse  Alcohol: 0 (12/10/2024 11:00 AM)  Illegal Dru (12/10/2024 11:00 AM)  Prescription Dru (12/10/2024 11:00 AM)    Personal History of Substance Abuse  Alcohol: 0 (12/10/2024 11:00 AM)  Illegal Drugs: 0 (12/10/2024 11:00 AM)  Prescription Drugs: 0 (12/10/2024 11:00 AM)    Patient Age (16-45)  Age (16-45): 1 (12/10/2024 11:00 AM)    History of Preadolescent Sexual Abuse  History of Preadolescent Sexual Abuse: 0 (12/10/2024 11:00 AM)    Psychological Disease  Attention Deficit Disorder, Obsessive Compulsive Disorder, Bipolar, Schizophrenia: 2 (12/10/2024 11:00 AM)  Depression: 1 (12/10/2024 11:00 AM)    Total Score  Total Score: 4 (12/10/2024 11:00  AM)    Total Score Risk Category  TOTAL SCORE CATEGORY: Moderate Risk (4-7) (12/10/2024 11:00 AM)        Pain Assessment:  Analgesia  What was your pain level on average during the past week?: 6  What was your pain level at its worst during the past week?: 8  What percentage of your pain has been relieved during the past week?: 95 %  Is the amount of pain relief you are now obtaining from your current pain relievers enough to make a real difference in your life?: Y  Query to Clinician: Is the patient's pain relief clinically significant?: Yes    Activities of Daily Living  Physical Functioning: Better  Family Relationships: Better  Social Relationships: Better  Mood: Better  Sleep Patterns: Better  Overall Functioning: Better    Adverse Events  Is patient experiencing any side effects from current pain relievers?: N      Assessment  Is your overall impression that this patient is benefiting from opioid therapy?: Yes  Specific Analgesic Plan: Continue present regimen        Stimulants:   What is the patient's goal of therapy? Improve focus and concentration  Is this being achieved with current treatment? yes    Activities of Daily Living:   Is your overall impression that this patient is benefiting (symptom reduction outweighs side effects) from stimulant therapy? Yes     1. Physical Functioning: Better  2. Family Relationship: Better  3. Social Relationship: Better  4. Mood: Better  5. Sleep Patterns: Better  6. Overall Function: Better      Review of Systems   Constitutional:  Negative for chills, diaphoresis, fever and unexpected weight change.   HENT:  Negative for congestion, sinus pressure, sinus pain, sneezing and sore throat.    Respiratory:  Negative for cough, chest tightness, shortness of breath and wheezing.    Cardiovascular:  Negative for chest pain, palpitations and leg swelling.   Gastrointestinal:  Negative for abdominal pain, constipation, diarrhea, nausea and vomiting.   Endocrine: Negative for  "cold intolerance, heat intolerance, polydipsia, polyphagia and polyuria.   Genitourinary:  Negative for dysuria, frequency, hematuria and urgency.   Musculoskeletal:  Positive for back pain and neck pain.   Skin:  Positive for rash.   Neurological:  Negative for dizziness, syncope, light-headedness, numbness and headaches.   Hematological:  Negative for adenopathy.   Psychiatric/Behavioral:  Negative for confusion and dysphoric mood. The patient is not nervous/anxious.        Objective   /77 (BP Location: Right arm, Patient Position: Sitting, BP Cuff Size: Adult)   Pulse 102   Resp 16   Ht 1.549 m (5' 1\")   Wt 51.7 kg (114 lb)   SpO2 99%   BMI 21.54 kg/m²     Physical Exam  Vitals and nursing note reviewed.   Constitutional:       General: She is not in acute distress.     Appearance: Normal appearance.   HENT:      Head: Normocephalic and atraumatic.      Nose: Nose normal.   Eyes:      Extraocular Movements: Extraocular movements intact.      Conjunctiva/sclera: Conjunctivae normal.      Pupils: Pupils are equal, round, and reactive to light.   Cardiovascular:      Rate and Rhythm: Normal rate and regular rhythm.      Heart sounds: No murmur heard.     No friction rub. No gallop.   Pulmonary:      Effort: Pulmonary effort is normal.      Breath sounds: Normal breath sounds. No wheezing, rhonchi or rales.   Abdominal:      General: Bowel sounds are normal. There is no distension.      Palpations: Abdomen is soft.      Tenderness: There is no abdominal tenderness.   Musculoskeletal:         General: Normal range of motion.      Cervical back: Normal range of motion and neck supple.   Skin:     General: Skin is warm and dry.      Findings: Acne present.   Neurological:      General: No focal deficit present.      Mental Status: She is alert and oriented to person, place, and time.      Deep Tendon Reflexes: Reflexes normal.   Psychiatric:         Mood and Affect: Mood normal.         Behavior: Behavior " normal.         Thought Content: Thought content normal.         Judgment: Judgment normal.         Assessment/Plan   Problem List Items Addressed This Visit             ICD-10-CM    Acne vulgaris L70.0     - start benzomycin gel         Relevant Medications    erythromycin-benzoyl peroxide (Benzamycin) gel    Adult ADHD (attention deficit hyperactivity disorder) - Primary F90.9     - controlled. Continue adderall          Relevant Orders    Vitamin B12    CBC and Auto Differential    Comprehensive Metabolic Panel    TSH with reflex to Free T4 if abnormal    Back pain, chronic M54.9, G89.29     - controlled on oxycodone/ acetaminophen           Relevant Medications    cyclobenzaprine (Flexeril) 10 mg tablet    Other Relevant Orders    Vitamin B12    CBC and Auto Differential    Comprehensive Metabolic Panel    TSH with reflex to Free T4 if abnormal    Cervical pain M54.2     - controlled on oxycodone/ acetaminophen           Relevant Medications    cyclobenzaprine (Flexeril) 10 mg tablet    Other Relevant Orders    Vitamin B12    CBC and Auto Differential    Comprehensive Metabolic Panel    TSH with reflex to Free T4 if abnormal    Chronic thoracic spine pain M54.6, G89.29     - controlled on oxycodone/ acetaminophen           Relevant Orders    Vitamin B12    CBC and Auto Differential    Comprehensive Metabolic Panel    TSH with reflex to Free T4 if abnormal    Cigarette nicotine dependence without complication F17.210     - encouraged cessation         Relevant Orders    Vitamin B12    CBC and Auto Differential    Comprehensive Metabolic Panel    TSH with reflex to Free T4 if abnormal    Encounter for immunization Z23     - PCV 20 administered today   - recommended the following vaccines at the pharmacy: flu, COVID-19         Relevant Orders    Pneumococcal conjugate vaccine, 20-valent (PREVNAR 20) (Completed)    Fibromyalgia M79.7     - controlled on oxycodone/ acetaminophen           Relevant Medications     cyclobenzaprine (Flexeril) 10 mg tablet    Other Relevant Orders    Vitamin B12    CBC and Auto Differential    Comprehensive Metabolic Panel    TSH with reflex to Free T4 if abnormal    Multiple thyroid nodules E04.2     - seen on US 9/2021  - per report, repeat US at 1, 2, 3, and 5 years  - stable on US 11/2023. Repeat again now          Relevant Orders    Vitamin B12    CBC and Auto Differential    Comprehensive Metabolic Panel    TSH with reflex to Free T4 if abnormal    US thyroid    Seasonal allergies J30.2     - continue atrovent as needed          Vitamin D deficiency E55.9    Relevant Orders    Vitamin D 25-Hydroxy,Total (for eval of Vitamin D levels)     Other Visit Diagnoses         Codes    Lipid screening     Z13.220    Relevant Orders    Lipid Panel    Nausea     R11.0    Relevant Medications    ondansetron ODT (Zofran-ODT) 4 mg disintegrating tablet

## 2024-12-10 NOTE — PATIENT INSTRUCTIONS
Nga Phoenix ,    Thank you for coming in today. We at [facility] appreciate your trust in our care. If you have any questions or concerns about the care you received today, please do not hesitate to contact us at [phone].    The following instructions were discussed today:    - get labs. For blood work: Nothing to eat or drink for at least 10 hours prior. Okay for water or black coffee.   - get thyroid ultrasound   - Follow up in 3 months for physical   - I recommend the following vaccines at the pharmacy: flu, COVID-19

## 2024-12-10 NOTE — ASSESSMENT & PLAN NOTE
- seen on US 9/2021  - per report, repeat US at 1, 2, 3, and 5 years  - stable on US 11/2023. Repeat again now

## 2024-12-13 ENCOUNTER — APPOINTMENT (OUTPATIENT)
Dept: OBSTETRICS AND GYNECOLOGY | Facility: CLINIC | Age: 29
End: 2024-12-13
Payer: COMMERCIAL